# Patient Record
Sex: FEMALE | Race: WHITE | NOT HISPANIC OR LATINO | Employment: FULL TIME | URBAN - METROPOLITAN AREA
[De-identification: names, ages, dates, MRNs, and addresses within clinical notes are randomized per-mention and may not be internally consistent; named-entity substitution may affect disease eponyms.]

---

## 2020-01-29 ENCOUNTER — CLINICAL SUPPORT (OUTPATIENT)
Dept: AUDIOLOGY | Facility: CLINIC | Age: 40
End: 2020-01-29
Payer: COMMERCIAL

## 2020-01-29 ENCOUNTER — OFFICE VISIT (OUTPATIENT)
Dept: OTOLARYNGOLOGY | Facility: CLINIC | Age: 40
End: 2020-01-29
Payer: COMMERCIAL

## 2020-01-29 VITALS
DIASTOLIC BLOOD PRESSURE: 83 MMHG | TEMPERATURE: 98 F | HEART RATE: 79 BPM | WEIGHT: 259.5 LBS | SYSTOLIC BLOOD PRESSURE: 134 MMHG

## 2020-01-29 DIAGNOSIS — H81.11 BPPV (BENIGN PAROXYSMAL POSITIONAL VERTIGO), RIGHT: ICD-10-CM

## 2020-01-29 DIAGNOSIS — J01.00 ACUTE NON-RECURRENT MAXILLARY SINUSITIS: ICD-10-CM

## 2020-01-29 DIAGNOSIS — H72.92 TYMPANIC MEMBRANE PERFORATION, LEFT: Primary | ICD-10-CM

## 2020-01-29 DIAGNOSIS — H81.11 BPPV (BENIGN PAROXYSMAL POSITIONAL VERTIGO), RIGHT: Primary | ICD-10-CM

## 2020-01-29 PROCEDURE — 99999 PR PBB SHADOW E&M-NEW PATIENT-LVL III: ICD-10-PCS | Mod: PBBFAC,,, | Performed by: PHYSICIAN ASSISTANT

## 2020-01-29 PROCEDURE — 99203 OFFICE O/P NEW LOW 30 MIN: CPT | Mod: 25,S$GLB,, | Performed by: PHYSICIAN ASSISTANT

## 2020-01-29 PROCEDURE — 99203 PR OFFICE/OUTPT VISIT, NEW, LEVL III, 30-44 MIN: ICD-10-PCS | Mod: 25,S$GLB,, | Performed by: PHYSICIAN ASSISTANT

## 2020-01-29 PROCEDURE — 99999 PR PBB SHADOW E&M-NEW PATIENT-LVL III: CPT | Mod: PBBFAC,,, | Performed by: PHYSICIAN ASSISTANT

## 2020-01-29 PROCEDURE — 95992 CANALITH REPOSITIONING PROC: CPT | Mod: S$GLB,,, | Performed by: PHYSICIAN ASSISTANT

## 2020-01-29 PROCEDURE — 92567 PR TYMPA2METRY: ICD-10-PCS | Mod: S$GLB,,, | Performed by: AUDIOLOGIST-HEARING AID FITTER

## 2020-01-29 PROCEDURE — 95992 PR CANALITH REPOSITIONING PROCEDURE, PER DAY: ICD-10-PCS | Mod: S$GLB,,, | Performed by: PHYSICIAN ASSISTANT

## 2020-01-29 PROCEDURE — 92567 TYMPANOMETRY: CPT | Mod: S$GLB,,, | Performed by: AUDIOLOGIST-HEARING AID FITTER

## 2020-01-29 RX ORDER — UREA 40 %
CREAM (GRAM) TOPICAL
COMMUNITY
Start: 2019-12-05 | End: 2022-08-17 | Stop reason: SDUPTHER

## 2020-01-29 RX ORDER — FLUTICASONE PROPIONATE 50 MCG
SPRAY, SUSPENSION (ML) NASAL
COMMUNITY
Start: 2020-01-22

## 2020-01-29 RX ORDER — LAMOTRIGINE 200 MG/1
TABLET ORAL
COMMUNITY
Start: 2020-01-04

## 2020-01-29 RX ORDER — CLINDAMYCIN AND BENZOYL PEROXIDE 10; 50 MG/G; MG/G
GEL TOPICAL
COMMUNITY
Start: 2019-12-05 | End: 2022-08-17 | Stop reason: SDUPTHER

## 2020-01-29 RX ORDER — OMEPRAZOLE 20 MG/1
CAPSULE, DELAYED RELEASE ORAL
COMMUNITY
Start: 2020-01-04

## 2020-01-29 RX ORDER — GABAPENTIN 600 MG/1
TABLET ORAL
COMMUNITY
Start: 2020-01-04

## 2020-01-29 RX ORDER — SERTRALINE HYDROCHLORIDE 100 MG/1
TABLET, FILM COATED ORAL
COMMUNITY
Start: 2020-01-04

## 2020-01-29 RX ORDER — TIZANIDINE 4 MG/1
TABLET ORAL
COMMUNITY
Start: 2020-01-04

## 2020-01-29 RX ORDER — METHYLPREDNISOLONE 4 MG/1
TABLET ORAL
Qty: 1 PACKAGE | Refills: 0 | Status: ON HOLD | OUTPATIENT
Start: 2020-01-29 | End: 2021-03-10 | Stop reason: HOSPADM

## 2020-01-29 RX ORDER — ESZOPICLONE 2 MG/1
TABLET, FILM COATED ORAL
COMMUNITY
Start: 2020-01-22

## 2020-01-29 RX ORDER — TRIAMCINOLONE ACETONIDE 1 MG/G
CREAM TOPICAL
COMMUNITY
Start: 2019-12-05

## 2020-01-29 RX ORDER — AMOXICILLIN AND CLAVULANATE POTASSIUM 875; 125 MG/1; MG/1
TABLET, FILM COATED ORAL
Status: ON HOLD | COMMUNITY
Start: 2020-01-24 | End: 2021-03-10 | Stop reason: HOSPADM

## 2020-01-29 NOTE — LETTER
January 29, 2020      [facility]  [id number] [place] [address]  [facility] [place] [address]  Phone: [phone]  Fax: [phone]       Patient: Selena Spring   YOB: 1980  Date of Visit: 01/29/2020    To Whom It May Concern:    Isi Spring  was at [facility] on 01/29/2020. She may return to work/school on 2/6/2020 with no restrictions. If you have any questions or concerns, or if I can be of further assistance, please do not hesitate to contact me.    Sincerely,        DESIREE Tran

## 2020-01-29 NOTE — PROGRESS NOTES
Subjective:       Patient ID: Selena Spring is a 39 y.o. female.    Chief Complaint: Ear Fullness (right steroid inj with pcp and given amoxil); Otalgia (right ); and Dizziness    Ms. Spring is a very pleasant 38 yo female here to see me today with complaints of right ear pain/fullness and dizziness. Right sided HA and facial pressure overlying sinus. She had a decadron shot and antibiotics ( which she is still taking) last week. She has been having dizziness when she moves her head quickly and arising from the bed.     Review of Systems   Constitutional: Negative for chills, fatigue, fever and unexpected weight change.   HENT: Positive for congestion, sinus pressure and sinus pain. Negative for dental problem, ear discharge, ear pain, facial swelling, hearing loss, nosebleeds, postnasal drip, rhinorrhea, sneezing, sore throat, tinnitus, trouble swallowing and voice change.    Eyes: Negative for redness, itching and visual disturbance.   Respiratory: Negative for cough, choking, shortness of breath and wheezing.    Cardiovascular: Negative for chest pain and palpitations.   Gastrointestinal: Negative for abdominal pain.        No reflux.   Musculoskeletal: Negative for gait problem.   Skin: Negative for rash.   Neurological: Positive for dizziness and headaches. Negative for light-headedness.       Objective:      Physical Exam   Constitutional: She is oriented to person, place, and time. She appears well-developed and well-nourished. No distress.   HENT:   Head: Normocephalic and atraumatic.   Right Ear: External ear and ear canal normal. Tympanic membrane is scarred.   Left Ear: External ear and ear canal normal. Tympanic membrane is perforated (large).   Nose: Mucosal edema present. No rhinorrhea, nasal deformity or septal deviation. No epistaxis. Right sinus exhibits maxillary sinus tenderness. Right sinus exhibits no frontal sinus tenderness. Left sinus exhibits no maxillary sinus tenderness and no frontal  sinus tenderness.   Mouth/Throat: Uvula is midline, oropharynx is clear and moist and mucous membranes are normal. Mucous membranes are not pale and not dry. No dental caries. No oropharyngeal exudate or posterior oropharyngeal erythema.   DEMARCUS BARRAZA PIKE (+) RIGHT   Eyes: Pupils are equal, round, and reactive to light. Conjunctivae, EOM and lids are normal. Right eye exhibits no chemosis. Left eye exhibits no chemosis. Right conjunctiva is not injected. Left conjunctiva is not injected. No scleral icterus. Right eye exhibits normal extraocular motion and no nystagmus. Left eye exhibits normal extraocular motion and no nystagmus.   Neck: Trachea normal and phonation normal. No tracheal tenderness present. No tracheal deviation present. No thyroid mass and no thyromegaly present.   Cardiovascular: Intact distal pulses.   Pulmonary/Chest: Effort normal. No stridor. No respiratory distress.   Abdominal: She exhibits no distension.   Lymphadenopathy:        Head (right side): No submental, no submandibular, no preauricular, no posterior auricular and no occipital adenopathy present.        Head (left side): No submental, no submandibular, no preauricular, no posterior auricular and no occipital adenopathy present.     She has no cervical adenopathy.   Neurological: She is alert and oriented to person, place, and time. No cranial nerve deficit.   Skin: Skin is warm and dry. No rash noted. No erythema.   Psychiatric: She has a normal mood and affect. Her behavior is normal.       Assessment:       1. BPPV (benign paroxysmal positional vertigo), right    2. Acute non-recurrent maxillary sinusitis        Plan:         Selena Spring was seen 01/29/2020 for Epley maneuver for BPPV in the right ear.      A 5-position Epley maneuver was performed for the right.  Patient tolerated the maneuver well and was asymptomatic upon discharge.  No nystagmus seen on post-procedure examination.  Post-Epley home instructions were reviewed  and given to the patient.  Understanding was voiced.    I also started her on medrol Dosepak for sinus infection relief. She is already taking augmentin.

## 2020-01-29 NOTE — LETTER
January 29, 2020      [facility]  [id number] [place] [address]  [facility] [place] [address]  Phone: [phone]  Fax: [phone]       Patient: Selena Spring   YOB: 1980  Date of Visit: 01/29/2020    To Whom It May Concern:    Isi Spring  was at [facility] on 01/29/2020. She may return to work/school on 2/3/2020 with no restrictions. If you have any questions or concerns, or if I can be of further assistance, please do not hesitate to contact me.    Sincerely,    LILLY Tran MA

## 2020-01-29 NOTE — PROGRESS NOTES
"Tympanograms were completed today 01/29/20 per Sammi Correa PA-C.  Right ear: Type "A"  1.4 ml @ 15 daPa.  ECV: .8 ml  Left ear: CNS, consistent with TM perforation    Right BPPV: assisted ENT with right Epley maneuver.   "

## 2020-02-05 ENCOUNTER — CLINICAL SUPPORT (OUTPATIENT)
Dept: AUDIOLOGY | Facility: CLINIC | Age: 40
End: 2020-02-05
Payer: COMMERCIAL

## 2020-02-05 DIAGNOSIS — H81.8X9 OTHER DISORDERS OF VESTIBULAR FUNCTION, UNSPECIFIED EAR: Primary | ICD-10-CM

## 2020-02-05 DIAGNOSIS — H90.12 CONDUCTIVE HEARING LOSS OF LEFT EAR WITH UNRESTRICTED HEARING OF RIGHT EAR: Primary | ICD-10-CM

## 2020-02-05 PROCEDURE — 92557 PR COMPREHENSIVE HEARING TEST: ICD-10-PCS | Mod: S$GLB,,, | Performed by: AUDIOLOGIST

## 2020-02-05 PROCEDURE — 92542 POSITIONAL NYSTAGMUS TEST: CPT | Mod: S$GLB,,, | Performed by: AUDIOLOGIST-HEARING AID FITTER

## 2020-02-05 PROCEDURE — 92567 PR TYMPA2METRY: ICD-10-PCS | Mod: S$GLB,,, | Performed by: AUDIOLOGIST

## 2020-02-05 PROCEDURE — 92557 COMPREHENSIVE HEARING TEST: CPT | Mod: S$GLB,,, | Performed by: AUDIOLOGIST

## 2020-02-05 PROCEDURE — 92542 PR POSITIONAL NYSTAGMUS TEST: ICD-10-PCS | Mod: S$GLB,,, | Performed by: AUDIOLOGIST-HEARING AID FITTER

## 2020-02-05 PROCEDURE — 92567 TYMPANOMETRY: CPT | Mod: S$GLB,,, | Performed by: AUDIOLOGIST

## 2020-02-05 NOTE — PROGRESS NOTES
Selena Spring was seen 02/05/2020 for an audiological evaluation.  Patient complains of hearing loss in the left ear.  She was treated prior to audiogram by Fritz Greer for BPPV in the right ear.  She will return on Tuesday, 2/11/2020 for VNG only.  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  She reports family history of hearing loss (both grandfathers.)    Results reveal a borderline normal hearing with a slight low frequency air/bone gap 250-8000 Hz for the right ear, and  Moderate rising to mild  conductive hearing loss 250-8000 Hz for the left ear.   Speech Reception Thresholds were  10 dBHL for the right ear and 30 dBHL for the left ear.   Word recognition scores were excellent for the right ear and good for the left ear.   Tympanograms were unable to obtain for the right ear and unable to obtain for the left ear.    Patient was counseled on the above findings.    REC:  VNG scheduled for 2/11/2020

## 2020-02-05 NOTE — PROGRESS NOTES
Referring provider: Sammi Correa PA-C    Selena Spring was seen 02/05/2020 for a vestibular screen for re-check BPPV.  Patient had right Epley maneuver last week for right BPPV.  She reports dizziness improved for several days but returned two days ago.  She reports sensation that the world is moving after she stops moving.  She now gets dizzy when lying on her left side.      VNG Screen:  Spontaneous test: Absent for nystagmus  Static Positional test: Absent for nystagmus  Dallas-Hallpike Right: Delayed onset 16 d/s down-beating nystagmus with slight 4 d/s RB nystagmus (non-torsional).  The nystagmus did not fatigue, but did suppress with fixation.  Dizziness was reported.   Yosi-Hallpike Left: 5 d/s down-beating + slight 4 d/s LB nystagmus (non-torsional).  The nystagmus did not fatigue, but did suppress with fixation.  Dizziness was less intense to HHL.   Yosi-Hallpike was re-tested without the video goggles, following a rest period: No nystagmus or dizziness was elicited.   Note: Patient has taken medications that may suppress a vestibular response.     Summary: Non-localizing positional nystagmus.  Diagnostic VNG with audiogram.     Recommendations:  1. Audiogram to be completed today.  2. VNG is scheduled for next week.  Pre-test instructions were provided and reviewed.

## 2020-02-11 ENCOUNTER — CLINICAL SUPPORT (OUTPATIENT)
Dept: AUDIOLOGY | Facility: CLINIC | Age: 40
End: 2020-02-11
Payer: COMMERCIAL

## 2020-02-11 DIAGNOSIS — H81.11 BPPV (BENIGN PAROXYSMAL POSITIONAL VERTIGO), RIGHT: Primary | ICD-10-CM

## 2020-02-11 PROCEDURE — 92537 PR CALORIC VSTBLR TEST W/REC BITHERMAL: ICD-10-PCS | Mod: S$GLB,,, | Performed by: AUDIOLOGIST-HEARING AID FITTER

## 2020-02-11 PROCEDURE — 92540 BASIC VESTIBULAR EVALUATION: CPT | Mod: S$GLB,,, | Performed by: AUDIOLOGIST-HEARING AID FITTER

## 2020-02-11 PROCEDURE — 92540 PR VESTIBULAR EVAL NYSTAG FOVL&PERPH STIM OSCIL TRACKING: ICD-10-PCS | Mod: S$GLB,,, | Performed by: AUDIOLOGIST-HEARING AID FITTER

## 2020-02-11 PROCEDURE — 92537 CALORIC VSTBLR TEST W/REC: CPT | Mod: S$GLB,,, | Performed by: AUDIOLOGIST-HEARING AID FITTER

## 2020-02-11 NOTE — PROGRESS NOTES
Referring provider: Sammi Correa PA-C    Selena Spring was seen 2020 for Videonystagmography (VNG) testing.    Patient was first seen 20 and had in-office Epley for right BPPV.  Following she reports vertigo improved but she continues to have some dizziness.  Dizzy symptoms are now short burst of world spinning and sensation of unstable when walking.  The dizziness is provoked only when turning her head to the right, primarily when lying, upon standing, upon sitting or when squatting.  Patient has left tympanic membrane perforation.  Had audiogram 20: left conductive hearing loss; right borderline normal hearing with mild air-bone-gap.     Oculomotor function tests (sinusoidal tracking, saccade and OPKs) were normal and symmetric.  Gaze test was absent for nystagmus.  Spontaneous nystagmus was absent.  Head-shake test was not tested due to neck problems.   McDade-Hallpike Left was absent for nystagmus or dizziness.  Yosi-Hallpike Right revealed 4 d/s LB with dizziness; (+) fixation suppression.  Static Positional test revealed nystagmus that suppressed with fixation:   Head Center: 4 d/s UB   Head Right: 4 d/s LB   Head Left: Absent  Bi-thermal caloric irrigations revealed a 11% caloric weakness in the right ear, which is within normal limits, and 11% directional preponderance to the right, which is within normal limits.  Fixation suppression following caloric irrigations were normal.  RC: 44 d/s   RW: 33 d/s   d/s   LW: CNT - patient unable to tolerate due to TM perforation    Impressions: Normal VNG.  Positional nystagmus to head right which may suggest incomplete central compensation.  A RIGHT Epley maneuver was completed today due to the presence of nystagmus.  Patient tolerated well and was asymptomatic upon discharge.      Rec:  1. Post-Epley head restrictions for five days.  2. Patient was provided Bacon-Daroff exercises to start in five days as needed, perform daily X 2-3  weeks.  3. Patient was provided with home right Epley maneuver exercise handout, to perform as needed.  4. If dizziness persists after one month of home exercises, patient is advised to contact ENT to request formal VRT referral.     Tracings are scanned into computer.

## 2021-02-12 ENCOUNTER — TELEPHONE (OUTPATIENT)
Dept: ORTHOPEDICS | Facility: CLINIC | Age: 41
End: 2021-02-12

## 2021-02-12 DIAGNOSIS — M25.551 RIGHT HIP PAIN: Primary | ICD-10-CM

## 2021-02-17 ENCOUNTER — TELEPHONE (OUTPATIENT)
Dept: ORTHOPEDICS | Facility: CLINIC | Age: 41
End: 2021-02-17

## 2021-02-17 ENCOUNTER — HOSPITAL ENCOUNTER (OUTPATIENT)
Dept: RADIOLOGY | Facility: HOSPITAL | Age: 41
Discharge: HOME | End: 2021-02-17
Attending: ORTHOPAEDIC SURGERY
Payer: COMMERCIAL

## 2021-02-17 ENCOUNTER — OFFICE VISIT (OUTPATIENT)
Dept: ORTHOPEDICS | Facility: CLINIC | Age: 41
End: 2021-02-17
Payer: COMMERCIAL

## 2021-02-17 VITALS — HEIGHT: 66 IN | WEIGHT: 220 LBS | BODY MASS INDEX: 35.36 KG/M2

## 2021-02-17 DIAGNOSIS — M25.551 RIGHT HIP PAIN: ICD-10-CM

## 2021-02-17 DIAGNOSIS — M25.851 RIGHT HIP IMPINGEMENT SYNDROME: Primary | ICD-10-CM

## 2021-02-17 PROCEDURE — 73502 X-RAY EXAM HIP UNI 2-3 VIEWS: CPT | Mod: 26,RT,, | Performed by: RADIOLOGY

## 2021-02-17 PROCEDURE — 73502 X-RAY EXAM HIP UNI 2-3 VIEWS: CPT | Mod: TC,RT

## 2021-02-17 PROCEDURE — 99204 OFFICE O/P NEW MOD 45 MIN: CPT | Mod: S$GLB,,, | Performed by: ORTHOPAEDIC SURGERY

## 2021-02-17 PROCEDURE — 3008F BODY MASS INDEX DOCD: CPT | Mod: CPTII,S$GLB,, | Performed by: ORTHOPAEDIC SURGERY

## 2021-02-17 PROCEDURE — 1125F AMNT PAIN NOTED PAIN PRSNT: CPT | Mod: S$GLB,,, | Performed by: ORTHOPAEDIC SURGERY

## 2021-02-17 PROCEDURE — 1125F PR PAIN SEVERITY QUANTIFIED, PAIN PRESENT: ICD-10-PCS | Mod: S$GLB,,, | Performed by: ORTHOPAEDIC SURGERY

## 2021-02-17 PROCEDURE — 73502 XR HIP 2 VIEW RIGHT: ICD-10-PCS | Mod: 26,RT,, | Performed by: RADIOLOGY

## 2021-02-17 PROCEDURE — 3008F PR BODY MASS INDEX (BMI) DOCUMENTED: ICD-10-PCS | Mod: CPTII,S$GLB,, | Performed by: ORTHOPAEDIC SURGERY

## 2021-02-17 PROCEDURE — 99999 PR PBB SHADOW E&M-EST. PATIENT-LVL III: ICD-10-PCS | Mod: PBBFAC,,, | Performed by: ORTHOPAEDIC SURGERY

## 2021-02-17 PROCEDURE — 99204 PR OFFICE/OUTPT VISIT, NEW, LEVL IV, 45-59 MIN: ICD-10-PCS | Mod: S$GLB,,, | Performed by: ORTHOPAEDIC SURGERY

## 2021-02-17 PROCEDURE — 99999 PR PBB SHADOW E&M-EST. PATIENT-LVL III: CPT | Mod: PBBFAC,,, | Performed by: ORTHOPAEDIC SURGERY

## 2021-02-23 ENCOUNTER — TELEPHONE (OUTPATIENT)
Dept: ORTHOPEDICS | Facility: CLINIC | Age: 41
End: 2021-02-23

## 2021-02-23 DIAGNOSIS — M25.559 HIP PAIN: Primary | ICD-10-CM

## 2021-02-25 ENCOUNTER — TELEPHONE (OUTPATIENT)
Dept: PAIN MEDICINE | Facility: CLINIC | Age: 41
End: 2021-02-25

## 2021-02-25 ENCOUNTER — PATIENT MESSAGE (OUTPATIENT)
Dept: PAIN MEDICINE | Facility: CLINIC | Age: 41
End: 2021-02-25

## 2021-02-25 DIAGNOSIS — M25.551 RIGHT HIP PAIN: Primary | ICD-10-CM

## 2021-03-02 ENCOUNTER — TELEPHONE (OUTPATIENT)
Dept: ORTHOPEDICS | Facility: CLINIC | Age: 41
End: 2021-03-02

## 2021-03-08 ENCOUNTER — PATIENT MESSAGE (OUTPATIENT)
Dept: PAIN MEDICINE | Facility: HOSPITAL | Age: 41
End: 2021-03-08

## 2021-03-10 ENCOUNTER — HOSPITAL ENCOUNTER (OUTPATIENT)
Facility: HOSPITAL | Age: 41
Discharge: HOME | End: 2021-03-10
Attending: ANESTHESIOLOGY | Admitting: ANESTHESIOLOGY
Payer: COMMERCIAL

## 2021-03-10 VITALS
OXYGEN SATURATION: 96 % | HEIGHT: 66 IN | WEIGHT: 222 LBS | TEMPERATURE: 98 F | SYSTOLIC BLOOD PRESSURE: 130 MMHG | HEART RATE: 70 BPM | BODY MASS INDEX: 35.68 KG/M2 | DIASTOLIC BLOOD PRESSURE: 75 MMHG | RESPIRATION RATE: 16 BRPM

## 2021-03-10 DIAGNOSIS — M25.559 HIP PAIN: Primary | ICD-10-CM

## 2021-03-10 LAB
B-HCG UR QL: NEGATIVE
CTP QC/QA: YES

## 2021-03-10 PROCEDURE — 77002 NEEDLE LOCALIZATION BY XRAY: CPT | Mod: 26,,, | Performed by: ANESTHESIOLOGY

## 2021-03-10 PROCEDURE — 81025 URINE PREGNANCY TEST: CPT | Performed by: ANESTHESIOLOGY

## 2021-03-10 PROCEDURE — 63600175 PHARM REV CODE 636 W HCPCS: Performed by: ANESTHESIOLOGY

## 2021-03-10 PROCEDURE — 20610 DRAIN/INJ JOINT/BURSA W/O US: CPT | Performed by: ANESTHESIOLOGY

## 2021-03-10 PROCEDURE — 20610 PR DRAIN/INJECT LARGE JOINT/BURSA: ICD-10-PCS | Mod: RT,,, | Performed by: ANESTHESIOLOGY

## 2021-03-10 PROCEDURE — 77002 PR FLUOROSCOPIC GUIDANCE NEEDLE PLACEMENT: ICD-10-PCS | Mod: 26,,, | Performed by: ANESTHESIOLOGY

## 2021-03-10 PROCEDURE — 25500020 PHARM REV CODE 255: Performed by: ANESTHESIOLOGY

## 2021-03-10 PROCEDURE — 25000003 PHARM REV CODE 250: Performed by: ANESTHESIOLOGY

## 2021-03-10 PROCEDURE — 20610 DRAIN/INJ JOINT/BURSA W/O US: CPT | Mod: RT,,, | Performed by: ANESTHESIOLOGY

## 2021-03-10 RX ORDER — METHYLPREDNISOLONE ACETATE 40 MG/ML
INJECTION, SUSPENSION INTRA-ARTICULAR; INTRALESIONAL; INTRAMUSCULAR; SOFT TISSUE
Status: DISCONTINUED | OUTPATIENT
Start: 2021-03-10 | End: 2021-03-10 | Stop reason: HOSPADM

## 2021-03-10 RX ORDER — FENTANYL CITRATE 50 UG/ML
INJECTION, SOLUTION INTRAMUSCULAR; INTRAVENOUS
Status: DISCONTINUED | OUTPATIENT
Start: 2021-03-10 | End: 2021-03-10 | Stop reason: HOSPADM

## 2021-03-10 RX ORDER — LIDOCAINE HYDROCHLORIDE 20 MG/ML
INJECTION, SOLUTION EPIDURAL; INFILTRATION; INTRACAUDAL; PERINEURAL
Status: DISCONTINUED | OUTPATIENT
Start: 2021-03-10 | End: 2021-03-10 | Stop reason: HOSPADM

## 2021-03-10 RX ORDER — MIDAZOLAM HYDROCHLORIDE 1 MG/ML
INJECTION, SOLUTION INTRAMUSCULAR; INTRAVENOUS
Status: DISCONTINUED | OUTPATIENT
Start: 2021-03-10 | End: 2021-03-10 | Stop reason: HOSPADM

## 2021-03-18 ENCOUNTER — PATIENT MESSAGE (OUTPATIENT)
Dept: ORTHOPEDICS | Facility: CLINIC | Age: 41
End: 2021-03-18

## 2021-03-18 DIAGNOSIS — M25.559 HIP PAIN: Primary | ICD-10-CM

## 2021-03-19 ENCOUNTER — HOSPITAL ENCOUNTER (OUTPATIENT)
Dept: RADIOLOGY | Facility: HOSPITAL | Age: 41
Discharge: HOME | End: 2021-03-19
Attending: ORTHOPAEDIC SURGERY
Payer: COMMERCIAL

## 2021-03-19 ENCOUNTER — OFFICE VISIT (OUTPATIENT)
Dept: ORTHOPEDICS | Facility: CLINIC | Age: 41
End: 2021-03-19
Payer: COMMERCIAL

## 2021-03-19 VITALS — DIASTOLIC BLOOD PRESSURE: 83 MMHG | SYSTOLIC BLOOD PRESSURE: 125 MMHG | HEART RATE: 73 BPM

## 2021-03-19 DIAGNOSIS — M25.559 HIP PAIN: ICD-10-CM

## 2021-03-19 DIAGNOSIS — M70.62 GREATER TROCHANTERIC BURSITIS OF LEFT HIP: Primary | ICD-10-CM

## 2021-03-19 DIAGNOSIS — M25.851 HIP IMPINGEMENT SYNDROME, RIGHT: ICD-10-CM

## 2021-03-19 PROCEDURE — 20610 LARGE JOINT ASPIRATION/INJECTION: L GREATER TROCHANTERIC BURSA: ICD-10-PCS | Mod: LT,S$GLB,, | Performed by: PHYSICIAN ASSISTANT

## 2021-03-19 PROCEDURE — 73503 X-RAY EXAM HIP UNI 4/> VIEWS: CPT | Mod: TC,LT

## 2021-03-19 PROCEDURE — 99999 PR PBB SHADOW E&M-EST. PATIENT-LVL III: ICD-10-PCS | Mod: PBBFAC,,, | Performed by: ORTHOPAEDIC SURGERY

## 2021-03-19 PROCEDURE — 20610 DRAIN/INJ JOINT/BURSA W/O US: CPT | Mod: LT,S$GLB,, | Performed by: PHYSICIAN ASSISTANT

## 2021-03-19 PROCEDURE — 99999 PR PBB SHADOW E&M-EST. PATIENT-LVL III: CPT | Mod: PBBFAC,,, | Performed by: ORTHOPAEDIC SURGERY

## 2021-03-19 PROCEDURE — 73503 XR HIP 4 OR MORE VIEWS LEFT: ICD-10-PCS | Mod: 26,LT,, | Performed by: RADIOLOGY

## 2021-03-19 PROCEDURE — 99214 PR OFFICE/OUTPT VISIT, EST, LEVL IV, 30-39 MIN: ICD-10-PCS | Mod: 25,S$GLB,, | Performed by: ORTHOPAEDIC SURGERY

## 2021-03-19 PROCEDURE — 73503 X-RAY EXAM HIP UNI 4/> VIEWS: CPT | Mod: 26,LT,, | Performed by: RADIOLOGY

## 2021-03-19 PROCEDURE — 99214 OFFICE O/P EST MOD 30 MIN: CPT | Mod: 25,S$GLB,, | Performed by: ORTHOPAEDIC SURGERY

## 2021-03-19 RX ORDER — METHYLPREDNISOLONE ACETATE 80 MG/ML
80 INJECTION, SUSPENSION INTRA-ARTICULAR; INTRALESIONAL; INTRAMUSCULAR; SOFT TISSUE
Status: DISCONTINUED | OUTPATIENT
Start: 2021-03-19 | End: 2021-03-19 | Stop reason: HOSPADM

## 2021-03-19 RX ADMIN — METHYLPREDNISOLONE ACETATE 80 MG: 80 INJECTION, SUSPENSION INTRA-ARTICULAR; INTRALESIONAL; INTRAMUSCULAR; SOFT TISSUE at 10:03

## 2021-04-19 ENCOUNTER — PATIENT MESSAGE (OUTPATIENT)
Dept: ORTHOPEDICS | Facility: CLINIC | Age: 41
End: 2021-04-19

## 2021-04-29 ENCOUNTER — OFFICE VISIT (OUTPATIENT)
Dept: ORTHOPEDICS | Facility: CLINIC | Age: 41
End: 2021-04-29
Payer: COMMERCIAL

## 2021-04-29 DIAGNOSIS — M25.559 PAIN IN UNSPECIFIED HIP: ICD-10-CM

## 2021-04-29 DIAGNOSIS — M70.61 GREATER TROCHANTERIC BURSITIS OF RIGHT HIP: ICD-10-CM

## 2021-04-29 DIAGNOSIS — M25.551 RIGHT HIP PAIN: ICD-10-CM

## 2021-04-29 DIAGNOSIS — M76.31 IT BAND SYNDROME, RIGHT: ICD-10-CM

## 2021-04-29 DIAGNOSIS — M25.851 RIGHT HIP IMPINGEMENT SYNDROME: Primary | ICD-10-CM

## 2021-04-29 PROCEDURE — 1125F PR PAIN SEVERITY QUANTIFIED, PAIN PRESENT: ICD-10-PCS | Mod: S$GLB,,, | Performed by: ORTHOPAEDIC SURGERY

## 2021-04-29 PROCEDURE — 1125F AMNT PAIN NOTED PAIN PRSNT: CPT | Mod: S$GLB,,, | Performed by: ORTHOPAEDIC SURGERY

## 2021-04-29 PROCEDURE — 99999 PR PBB SHADOW E&M-EST. PATIENT-LVL III: CPT | Mod: PBBFAC,,, | Performed by: ORTHOPAEDIC SURGERY

## 2021-04-29 PROCEDURE — 99999 PR PBB SHADOW E&M-EST. PATIENT-LVL III: ICD-10-PCS | Mod: PBBFAC,,, | Performed by: ORTHOPAEDIC SURGERY

## 2021-04-29 PROCEDURE — 99214 OFFICE O/P EST MOD 30 MIN: CPT | Mod: S$GLB,,, | Performed by: ORTHOPAEDIC SURGERY

## 2021-04-29 PROCEDURE — 99214 PR OFFICE/OUTPT VISIT, EST, LEVL IV, 30-39 MIN: ICD-10-PCS | Mod: S$GLB,,, | Performed by: ORTHOPAEDIC SURGERY

## 2021-05-02 ENCOUNTER — PATIENT MESSAGE (OUTPATIENT)
Dept: ORTHOPEDICS | Facility: CLINIC | Age: 41
End: 2021-05-02

## 2021-05-04 ENCOUNTER — HOSPITAL ENCOUNTER (OUTPATIENT)
Dept: RADIOLOGY | Facility: HOSPITAL | Age: 41
Discharge: HOME | End: 2021-05-04
Attending: PHYSICIAN ASSISTANT
Payer: COMMERCIAL

## 2021-05-04 DIAGNOSIS — M25.851 RIGHT HIP IMPINGEMENT SYNDROME: ICD-10-CM

## 2021-05-04 DIAGNOSIS — M25.559 PAIN IN UNSPECIFIED HIP: ICD-10-CM

## 2021-05-04 PROCEDURE — 73722 MRI JOINT OF LWR EXTR W/DYE: CPT | Mod: 26,RT,, | Performed by: RADIOLOGY

## 2021-05-04 PROCEDURE — 73722 MRI ARTHROGRAM HIP RIGHT: ICD-10-PCS | Mod: 26,RT,, | Performed by: RADIOLOGY

## 2021-05-04 PROCEDURE — 73525 CONTRAST X-RAY OF HIP: CPT | Mod: TC,RT

## 2021-05-04 PROCEDURE — 73525 XR ARTHROGRAM HIP RIGHT: ICD-10-PCS | Mod: 26,RT,, | Performed by: RADIOLOGY

## 2021-05-04 PROCEDURE — 25000003 PHARM REV CODE 250: Performed by: PHYSICIAN ASSISTANT

## 2021-05-04 PROCEDURE — A9585 GADOBUTROL INJECTION: HCPCS | Performed by: PHYSICIAN ASSISTANT

## 2021-05-04 PROCEDURE — 27093 INJECTION FOR HIP X-RAY: CPT

## 2021-05-04 PROCEDURE — 73722 MRI JOINT OF LWR EXTR W/DYE: CPT | Mod: TC,RT

## 2021-05-04 PROCEDURE — 27093 INJECTION FOR HIP X-RAY: CPT | Mod: ,,, | Performed by: RADIOLOGY

## 2021-05-04 PROCEDURE — 27093 XR ARTHROGRAM HIP RIGHT: ICD-10-PCS | Mod: ,,, | Performed by: RADIOLOGY

## 2021-05-04 PROCEDURE — 25500020 PHARM REV CODE 255: Performed by: PHYSICIAN ASSISTANT

## 2021-05-04 PROCEDURE — 73525 CONTRAST X-RAY OF HIP: CPT | Mod: 26,RT,, | Performed by: RADIOLOGY

## 2021-05-04 RX ORDER — GADOBUTROL 604.72 MG/ML
0.1 INJECTION INTRAVENOUS
Status: COMPLETED | OUTPATIENT
Start: 2021-05-04 | End: 2021-05-04

## 2021-05-04 RX ORDER — LIDOCAINE HYDROCHLORIDE 10 MG/ML
5 INJECTION INFILTRATION; PERINEURAL ONCE
Status: COMPLETED | OUTPATIENT
Start: 2021-05-04 | End: 2021-05-04

## 2021-05-04 RX ADMIN — LIDOCAINE HYDROCHLORIDE 5 ML: 10 INJECTION, SOLUTION INFILTRATION; PERINEURAL at 11:05

## 2021-05-04 RX ADMIN — GADOBUTROL 0.1 ML: 604.72 INJECTION INTRAVENOUS at 11:05

## 2021-05-04 RX ADMIN — IOHEXOL 10 ML: 350 INJECTION, SOLUTION INTRAVENOUS at 11:05

## 2021-05-06 ENCOUNTER — OFFICE VISIT (OUTPATIENT)
Dept: ORTHOPEDICS | Facility: CLINIC | Age: 41
End: 2021-05-06
Payer: COMMERCIAL

## 2021-05-06 VITALS — HEIGHT: 66 IN | BODY MASS INDEX: 35.68 KG/M2 | WEIGHT: 222 LBS

## 2021-05-06 DIAGNOSIS — M25.851 HIP IMPINGEMENT SYNDROME, RIGHT: ICD-10-CM

## 2021-05-06 DIAGNOSIS — S73.192A TEAR OF LEFT ACETABULAR LABRUM, INITIAL ENCOUNTER: ICD-10-CM

## 2021-05-06 DIAGNOSIS — M70.61 GREATER TROCHANTERIC BURSITIS OF RIGHT HIP: ICD-10-CM

## 2021-05-06 DIAGNOSIS — M76.31 IT BAND SYNDROME, RIGHT: ICD-10-CM

## 2021-05-06 DIAGNOSIS — M25.551 RIGHT HIP PAIN: ICD-10-CM

## 2021-05-06 DIAGNOSIS — M25.559 HIP PAIN: ICD-10-CM

## 2021-05-06 DIAGNOSIS — M25.851 RIGHT HIP IMPINGEMENT SYNDROME: Primary | ICD-10-CM

## 2021-05-06 DIAGNOSIS — M70.62 GREATER TROCHANTERIC BURSITIS OF LEFT HIP: ICD-10-CM

## 2021-05-06 PROCEDURE — 99999 PR PBB SHADOW E&M-EST. PATIENT-LVL III: CPT | Mod: PBBFAC,,, | Performed by: ORTHOPAEDIC SURGERY

## 2021-05-06 PROCEDURE — 3008F PR BODY MASS INDEX (BMI) DOCUMENTED: ICD-10-PCS | Mod: CPTII,S$GLB,, | Performed by: ORTHOPAEDIC SURGERY

## 2021-05-06 PROCEDURE — 99214 OFFICE O/P EST MOD 30 MIN: CPT | Mod: S$GLB,,, | Performed by: ORTHOPAEDIC SURGERY

## 2021-05-06 PROCEDURE — 1125F PR PAIN SEVERITY QUANTIFIED, PAIN PRESENT: ICD-10-PCS | Mod: S$GLB,,, | Performed by: ORTHOPAEDIC SURGERY

## 2021-05-06 PROCEDURE — 99214 PR OFFICE/OUTPT VISIT, EST, LEVL IV, 30-39 MIN: ICD-10-PCS | Mod: S$GLB,,, | Performed by: ORTHOPAEDIC SURGERY

## 2021-05-06 PROCEDURE — 3008F BODY MASS INDEX DOCD: CPT | Mod: CPTII,S$GLB,, | Performed by: ORTHOPAEDIC SURGERY

## 2021-05-06 PROCEDURE — 1125F AMNT PAIN NOTED PAIN PRSNT: CPT | Mod: S$GLB,,, | Performed by: ORTHOPAEDIC SURGERY

## 2021-05-06 PROCEDURE — 99999 PR PBB SHADOW E&M-EST. PATIENT-LVL III: ICD-10-PCS | Mod: PBBFAC,,, | Performed by: ORTHOPAEDIC SURGERY

## 2021-05-07 ENCOUNTER — TELEPHONE (OUTPATIENT)
Dept: ORTHOPEDICS | Facility: CLINIC | Age: 41
End: 2021-05-07

## 2021-05-21 ENCOUNTER — TELEPHONE (OUTPATIENT)
Dept: ORTHOPEDICS | Facility: CLINIC | Age: 41
End: 2021-05-21

## 2021-05-26 ENCOUNTER — OFFICE VISIT (OUTPATIENT)
Dept: DERMATOLOGY | Facility: CLINIC | Age: 41
End: 2021-05-26
Payer: COMMERCIAL

## 2021-05-26 DIAGNOSIS — Z85.828 HISTORY OF BASAL CELL CARCINOMA: Primary | ICD-10-CM

## 2021-05-26 DIAGNOSIS — L73.9 FOLLICULITIS: ICD-10-CM

## 2021-05-26 PROCEDURE — 11900 INJECT SKIN LESIONS </W 7: CPT | Mod: S$GLB,,, | Performed by: STUDENT IN AN ORGANIZED HEALTH CARE EDUCATION/TRAINING PROGRAM

## 2021-05-26 PROCEDURE — 11900 PR INJECTION INTO SKIN LESIONS, UP TO 7: ICD-10-PCS | Mod: S$GLB,,, | Performed by: STUDENT IN AN ORGANIZED HEALTH CARE EDUCATION/TRAINING PROGRAM

## 2021-05-26 PROCEDURE — 99999 PR PBB SHADOW E&M-EST. PATIENT-LVL III: CPT | Mod: PBBFAC,,, | Performed by: STUDENT IN AN ORGANIZED HEALTH CARE EDUCATION/TRAINING PROGRAM

## 2021-05-26 PROCEDURE — 99203 OFFICE O/P NEW LOW 30 MIN: CPT | Mod: 25,S$GLB,, | Performed by: STUDENT IN AN ORGANIZED HEALTH CARE EDUCATION/TRAINING PROGRAM

## 2021-05-26 PROCEDURE — 99999 PR PBB SHADOW E&M-EST. PATIENT-LVL III: ICD-10-PCS | Mod: PBBFAC,,, | Performed by: STUDENT IN AN ORGANIZED HEALTH CARE EDUCATION/TRAINING PROGRAM

## 2021-05-26 PROCEDURE — 99203 PR OFFICE/OUTPT VISIT, NEW, LEVL III, 30-44 MIN: ICD-10-PCS | Mod: 25,S$GLB,, | Performed by: STUDENT IN AN ORGANIZED HEALTH CARE EDUCATION/TRAINING PROGRAM

## 2021-05-26 RX ORDER — IPRATROPIUM BROMIDE 21 UG/1
SPRAY, METERED NASAL
COMMUNITY
Start: 2021-01-04

## 2021-05-28 ENCOUNTER — HOSPITAL ENCOUNTER (OUTPATIENT)
Dept: RADIOLOGY | Facility: HOSPITAL | Age: 41
Discharge: HOME | End: 2021-05-28
Attending: ORTHOPAEDIC SURGERY
Payer: COMMERCIAL

## 2021-05-28 DIAGNOSIS — S73.192A TEAR OF LEFT ACETABULAR LABRUM, INITIAL ENCOUNTER: ICD-10-CM

## 2021-05-28 DIAGNOSIS — M25.559 HIP PAIN: ICD-10-CM

## 2021-05-28 DIAGNOSIS — M70.62 GREATER TROCHANTERIC BURSITIS OF LEFT HIP: ICD-10-CM

## 2021-05-28 PROCEDURE — 27093 INJECTION FOR HIP X-RAY: CPT

## 2021-05-28 PROCEDURE — 25500020 PHARM REV CODE 255: Performed by: ORTHOPAEDIC SURGERY

## 2021-05-28 PROCEDURE — 73722 MRI JOINT OF LWR EXTR W/DYE: CPT | Mod: TC,LT

## 2021-05-28 PROCEDURE — 27093 INJECTION FOR HIP X-RAY: CPT | Mod: ,,, | Performed by: RADIOLOGY

## 2021-05-28 PROCEDURE — 73525 CONTRAST X-RAY OF HIP: CPT | Mod: 26,LT,, | Performed by: RADIOLOGY

## 2021-05-28 PROCEDURE — A9585 GADOBUTROL INJECTION: HCPCS | Performed by: ORTHOPAEDIC SURGERY

## 2021-05-28 PROCEDURE — 27093 XR ARTHROGRAM HIP LEFT WITH MRI TO FOLLOW: ICD-10-PCS | Mod: ,,, | Performed by: RADIOLOGY

## 2021-05-28 PROCEDURE — 73722 MRI JOINT OF LWR EXTR W/DYE: CPT | Mod: 26,LT,, | Performed by: RADIOLOGY

## 2021-05-28 PROCEDURE — 73525 CONTRAST X-RAY OF HIP: CPT | Mod: TC,LT

## 2021-05-28 PROCEDURE — 73525 XR ARTHROGRAM HIP LEFT WITH MRI TO FOLLOW: ICD-10-PCS | Mod: 26,LT,, | Performed by: RADIOLOGY

## 2021-05-28 PROCEDURE — 73722 MRI ARTHROGRAM HIP LEFT: ICD-10-PCS | Mod: 26,LT,, | Performed by: RADIOLOGY

## 2021-05-28 RX ORDER — GADOBUTROL 604.72 MG/ML
0.1 INJECTION INTRAVENOUS
Status: COMPLETED | OUTPATIENT
Start: 2021-05-28 | End: 2021-05-28

## 2021-05-28 RX ADMIN — IOHEXOL 10 ML: 350 INJECTION, SOLUTION INTRAVENOUS at 01:05

## 2021-05-28 RX ADMIN — GADOBUTROL 0.1 ML: 604.72 INJECTION INTRAVENOUS at 01:05

## 2021-05-31 ENCOUNTER — OFFICE VISIT (OUTPATIENT)
Dept: ORTHOPEDICS | Facility: CLINIC | Age: 41
End: 2021-05-31
Payer: COMMERCIAL

## 2021-05-31 DIAGNOSIS — M25.852 LEFT HIP IMPINGEMENT SYNDROME: ICD-10-CM

## 2021-05-31 DIAGNOSIS — S73.192D TEAR OF LEFT ACETABULAR LABRUM, SUBSEQUENT ENCOUNTER: ICD-10-CM

## 2021-05-31 DIAGNOSIS — M25.851 RIGHT HIP IMPINGEMENT SYNDROME: Primary | ICD-10-CM

## 2021-05-31 DIAGNOSIS — S73.191D TEAR OF RIGHT ACETABULAR LABRUM, SUBSEQUENT ENCOUNTER: ICD-10-CM

## 2021-05-31 PROCEDURE — 1125F AMNT PAIN NOTED PAIN PRSNT: CPT | Mod: S$GLB,,, | Performed by: ORTHOPAEDIC SURGERY

## 2021-05-31 PROCEDURE — 99999 PR PBB SHADOW E&M-EST. PATIENT-LVL III: ICD-10-PCS | Mod: PBBFAC,,, | Performed by: ORTHOPAEDIC SURGERY

## 2021-05-31 PROCEDURE — 99999 PR PBB SHADOW E&M-EST. PATIENT-LVL III: CPT | Mod: PBBFAC,,, | Performed by: ORTHOPAEDIC SURGERY

## 2021-05-31 PROCEDURE — 1125F PR PAIN SEVERITY QUANTIFIED, PAIN PRESENT: ICD-10-PCS | Mod: S$GLB,,, | Performed by: ORTHOPAEDIC SURGERY

## 2021-05-31 PROCEDURE — 99214 OFFICE O/P EST MOD 30 MIN: CPT | Mod: S$GLB,,, | Performed by: ORTHOPAEDIC SURGERY

## 2021-05-31 PROCEDURE — 99214 PR OFFICE/OUTPT VISIT, EST, LEVL IV, 30-39 MIN: ICD-10-PCS | Mod: S$GLB,,, | Performed by: ORTHOPAEDIC SURGERY

## 2021-06-01 ENCOUNTER — TELEPHONE (OUTPATIENT)
Dept: ORTHOPEDICS | Facility: CLINIC | Age: 41
End: 2021-06-01

## 2021-06-03 ENCOUNTER — TELEPHONE (OUTPATIENT)
Dept: ORTHOPEDICS | Facility: CLINIC | Age: 41
End: 2021-06-03

## 2021-06-03 ENCOUNTER — PATIENT MESSAGE (OUTPATIENT)
Dept: ORTHOPEDICS | Facility: CLINIC | Age: 41
End: 2021-06-03

## 2021-07-14 ENCOUNTER — TELEPHONE (OUTPATIENT)
Dept: ORTHOPEDICS | Facility: CLINIC | Age: 41
End: 2021-07-14

## 2021-07-14 ENCOUNTER — PATIENT MESSAGE (OUTPATIENT)
Dept: ORTHOPEDICS | Facility: CLINIC | Age: 41
End: 2021-07-14

## 2021-09-01 ENCOUNTER — PATIENT MESSAGE (OUTPATIENT)
Dept: ORTHOPEDICS | Facility: CLINIC | Age: 41
End: 2021-09-01

## 2021-09-02 ENCOUNTER — OFFICE VISIT (OUTPATIENT)
Dept: ORTHOPEDICS | Facility: CLINIC | Age: 41
End: 2021-09-02
Payer: COMMERCIAL

## 2021-09-02 DIAGNOSIS — M25.851 RIGHT HIP IMPINGEMENT SYNDROME: ICD-10-CM

## 2021-09-02 DIAGNOSIS — M25.852 LEFT HIP IMPINGEMENT SYNDROME: ICD-10-CM

## 2021-09-02 DIAGNOSIS — S73.191D TEAR OF RIGHT ACETABULAR LABRUM, SUBSEQUENT ENCOUNTER: ICD-10-CM

## 2021-09-02 DIAGNOSIS — S73.192D TEAR OF LEFT ACETABULAR LABRUM, SUBSEQUENT ENCOUNTER: Primary | ICD-10-CM

## 2021-09-02 PROCEDURE — 99214 PR OFFICE/OUTPT VISIT, EST, LEVL IV, 30-39 MIN: ICD-10-PCS | Mod: S$GLB,,, | Performed by: ORTHOPAEDIC SURGERY

## 2021-09-02 PROCEDURE — 99214 OFFICE O/P EST MOD 30 MIN: CPT | Mod: S$GLB,,, | Performed by: ORTHOPAEDIC SURGERY

## 2021-09-02 PROCEDURE — 1159F PR MEDICATION LIST DOCUMENTED IN MEDICAL RECORD: ICD-10-PCS | Mod: CPTII,S$GLB,, | Performed by: ORTHOPAEDIC SURGERY

## 2021-09-02 PROCEDURE — 99999 PR PBB SHADOW E&M-EST. PATIENT-LVL III: ICD-10-PCS | Mod: PBBFAC,,, | Performed by: ORTHOPAEDIC SURGERY

## 2021-09-02 PROCEDURE — 1159F MED LIST DOCD IN RCRD: CPT | Mod: CPTII,S$GLB,, | Performed by: ORTHOPAEDIC SURGERY

## 2021-09-02 PROCEDURE — 99999 PR PBB SHADOW E&M-EST. PATIENT-LVL III: CPT | Mod: PBBFAC,,, | Performed by: ORTHOPAEDIC SURGERY

## 2022-03-09 ENCOUNTER — PATIENT MESSAGE (OUTPATIENT)
Dept: OBSTETRICS AND GYNECOLOGY | Facility: CLINIC | Age: 42
End: 2022-03-09
Payer: COMMERCIAL

## 2022-05-11 ENCOUNTER — TELEPHONE (OUTPATIENT)
Dept: OBSTETRICS AND GYNECOLOGY | Facility: CLINIC | Age: 42
End: 2022-05-11
Payer: COMMERCIAL

## 2022-05-11 ENCOUNTER — PATIENT MESSAGE (OUTPATIENT)
Dept: OBSTETRICS AND GYNECOLOGY | Facility: CLINIC | Age: 42
End: 2022-05-11
Payer: COMMERCIAL

## 2022-05-11 NOTE — TELEPHONE ENCOUNTER
Contacted pt to advise her appt needs to r/s due to the provider being out on 5/16/2022.    No answer, lvm to return call to clinic.

## 2022-08-05 ENCOUNTER — TELEPHONE (OUTPATIENT)
Dept: OBSTETRICS AND GYNECOLOGY | Facility: CLINIC | Age: 42
End: 2022-08-05
Payer: COMMERCIAL

## 2022-08-09 ENCOUNTER — OFFICE VISIT (OUTPATIENT)
Dept: OBSTETRICS AND GYNECOLOGY | Facility: CLINIC | Age: 42
End: 2022-08-09
Payer: COMMERCIAL

## 2022-08-09 ENCOUNTER — LAB VISIT (OUTPATIENT)
Dept: LAB | Facility: HOSPITAL | Age: 42
End: 2022-08-09
Payer: COMMERCIAL

## 2022-08-09 VITALS
BODY MASS INDEX: 36.19 KG/M2 | DIASTOLIC BLOOD PRESSURE: 80 MMHG | SYSTOLIC BLOOD PRESSURE: 135 MMHG | WEIGHT: 224.19 LBS

## 2022-08-09 DIAGNOSIS — R10.2 PELVIC PAIN: ICD-10-CM

## 2022-08-09 DIAGNOSIS — Z01.419 ENCOUNTER FOR ANNUAL ROUTINE GYNECOLOGICAL EXAMINATION: Primary | ICD-10-CM

## 2022-08-09 DIAGNOSIS — Z12.4 CERVICAL CANCER SCREENING: ICD-10-CM

## 2022-08-09 DIAGNOSIS — Z85.3 HISTORY OF BREAST CANCER: ICD-10-CM

## 2022-08-09 PROCEDURE — 87624 HPV HI-RISK TYP POOLED RSLT: CPT

## 2022-08-09 PROCEDURE — 99386 PREV VISIT NEW AGE 40-64: CPT | Mod: S$GLB,,,

## 2022-08-09 PROCEDURE — 3008F PR BODY MASS INDEX (BMI) DOCUMENTED: ICD-10-PCS | Mod: CPTII,S$GLB,,

## 2022-08-09 PROCEDURE — 3008F BODY MASS INDEX DOCD: CPT | Mod: CPTII,S$GLB,,

## 2022-08-09 PROCEDURE — 99999 PR PBB SHADOW E&M-EST. PATIENT-LVL III: ICD-10-PCS | Mod: PBBFAC,,,

## 2022-08-09 PROCEDURE — 3079F PR MOST RECENT DIASTOLIC BLOOD PRESSURE 80-89 MM HG: ICD-10-PCS | Mod: CPTII,S$GLB,,

## 2022-08-09 PROCEDURE — 1160F PR REVIEW ALL MEDS BY PRESCRIBER/CLIN PHARMACIST DOCUMENTED: ICD-10-PCS | Mod: CPTII,S$GLB,,

## 2022-08-09 PROCEDURE — 3079F DIAST BP 80-89 MM HG: CPT | Mod: CPTII,S$GLB,,

## 2022-08-09 PROCEDURE — 99999 PR PBB SHADOW E&M-EST. PATIENT-LVL III: CPT | Mod: PBBFAC,,,

## 2022-08-09 PROCEDURE — 1159F MED LIST DOCD IN RCRD: CPT | Mod: CPTII,S$GLB,,

## 2022-08-09 PROCEDURE — 81162 BRCA1&2 GEN FULL SEQ DUP/DEL: CPT

## 2022-08-09 PROCEDURE — 1159F PR MEDICATION LIST DOCUMENTED IN MEDICAL RECORD: ICD-10-PCS | Mod: CPTII,S$GLB,,

## 2022-08-09 PROCEDURE — 88175 CYTOPATH C/V AUTO FLUID REDO: CPT

## 2022-08-09 PROCEDURE — 3075F PR MOST RECENT SYSTOLIC BLOOD PRESS GE 130-139MM HG: ICD-10-PCS | Mod: CPTII,S$GLB,,

## 2022-08-09 PROCEDURE — 36415 COLL VENOUS BLD VENIPUNCTURE: CPT

## 2022-08-09 PROCEDURE — 3075F SYST BP GE 130 - 139MM HG: CPT | Mod: CPTII,S$GLB,,

## 2022-08-09 PROCEDURE — 1160F RVW MEDS BY RX/DR IN RCRD: CPT | Mod: CPTII,S$GLB,,

## 2022-08-09 PROCEDURE — 99386 PR PREVENTIVE VISIT,NEW,40-64: ICD-10-PCS | Mod: S$GLB,,,

## 2022-08-09 NOTE — PROGRESS NOTES
Subjective:       Patient ID: Selena Spring is a 41 y.o. female.    Chief Complaint:  Annual Exam      History of Present Illness  HPI  Annual Exam-Premenopausal  Patient presents for annual exam. The patient is not sexually active, states it has been many years since sexual intercourse. GYN screening history: last pap: was normal. The patient wears seatbelts: yes. The patient participates in regular exercise: yes. Has the patient ever been transfused or tattooed?: yes, tattoos. The patient reports that there is not domestic violence in her life.    Patient doesn't recall date of last pap smear but knows that it was normal.   Patient has a history of irregular cycles  Cycles will come every other month, some months are lighter and some months are heavy with severe cramping (doubled over in pain)  Has a family history of multiple GYN cancers (ovarian, breast, and endometrial cancer)  Does no desire fertility or BC to help regulate cycles, would prefer to have hysterectomy. States both her mother and grandmother had to have hysterectomies at her age.     GYN & OB History  Patient's last menstrual period was 07/26/2022.   Date of Last Pap: 8/9/2022    OB History   No obstetric history on file.       Review of Systems  Review of Systems   Constitutional: Negative.    HENT: Negative.    Eyes: Negative.    Respiratory: Negative.    Cardiovascular: Negative.    Gastrointestinal: Negative.    Genitourinary: Positive for dysmenorrhea, menstrual problem and pelvic pain.   Musculoskeletal: Negative.    Integumentary:  Negative.   Neurological: Negative.    Hematological: Negative.    Psychiatric/Behavioral: Negative.    All other systems reviewed and are negative.  Breast: negative.            Objective:      Physical Exam:   Constitutional: She is oriented to person, place, and time. She appears well-developed and well-nourished.    HENT:   Head: Normocephalic and atraumatic.   Nose: Nose normal.    Eyes: Pupils are  equal, round, and reactive to light. Conjunctivae and EOM are normal.     Cardiovascular: Normal rate and regular rhythm.     Pulmonary/Chest: Effort normal. She has no decreased breath sounds. She has no rhonchi. Right breast exhibits no inverted nipple, no mass, no nipple discharge, no tenderness and no bleeding. Left breast exhibits no inverted nipple, no mass, no nipple discharge, no tenderness and no bleeding. Breasts are symmetrical.        Abdominal: Soft. There is no abdominal tenderness.     Genitourinary:    Inguinal canal, vagina, uterus, right adnexa, left adnexa and rectum normal.      Pelvic exam was performed with patient supine.   The external female genitalia was normal.   No external genitalia lesions identified,Genitalia hair distrobution normal .   Labial bartholins normal.Cervix is normal. Right adnexum displays no mass and no tenderness. Left adnexum displays no mass and no tenderness. No  no vaginal discharge, tenderness or bleeding in the vagina. Vagina was moist.Cervix exhibits no motion tenderness, no discharge and no tenderness.    pap smear completedUerus contour normal  Uterus is not tender. Uterus size: 8 cm.Normal urethral meatus.          Musculoskeletal: Normal range of motion and moves all extremeties.       Neurological: She is alert and oriented to person, place, and time.    Skin: Skin is warm and dry.    Psychiatric: She has a normal mood and affect. Her speech is normal and behavior is normal. Mood, judgment and thought content normal.             Assessment:        1. Encounter for annual routine gynecological examination    2. Cervical cancer screening    3. Pelvic pain    4. History of breast cancer               Plan:   Continue annual well woman exam.  Pap today. Patient was counseled today on ASCCP screening guidelines (pap smear every 3 years in low risk patients) and recommendations for annual pelvic exams and clinical breast exams, yearly mammograms starting at age 40;  and to see her PCP for other healthcare maintenance.   Mammo ordered by PCP  Continue menstrual calender   Refer to MD for hyst consult     Diagnosis and orders this visit:  Encounter for annual routine gynecological examination    Cervical cancer screening  -     Liquid-Based Pap Smear, Screening  -     HPV High Risk Genotypes, PCR    Pelvic pain  -     US Pelvis Complete Non OB; Future; Expected date: 08/09/2022    History of breast cancer  -     Comprehensive BRCA 1/2 Analysis; Future; Expected date: 08/09/2022          Cynthia Fregoso NP

## 2022-08-11 ENCOUNTER — HOSPITAL ENCOUNTER (OUTPATIENT)
Dept: RADIOLOGY | Facility: HOSPITAL | Age: 42
Discharge: HOME | End: 2022-08-11
Payer: COMMERCIAL

## 2022-08-11 DIAGNOSIS — R10.2 PELVIC PAIN: ICD-10-CM

## 2022-08-11 PROCEDURE — 76856 US EXAM PELVIC COMPLETE: CPT | Mod: TC

## 2022-08-11 PROCEDURE — 76856 US EXAM PELVIC COMPLETE: CPT | Mod: 26,,, | Performed by: RADIOLOGY

## 2022-08-11 PROCEDURE — 76856 US PELVIS COMPLETE NON OB: ICD-10-PCS | Mod: 26,,, | Performed by: RADIOLOGY

## 2022-08-17 ENCOUNTER — OFFICE VISIT (OUTPATIENT)
Dept: DERMATOLOGY | Facility: CLINIC | Age: 42
End: 2022-08-17
Payer: COMMERCIAL

## 2022-08-17 DIAGNOSIS — L30.9 ECZEMA, UNSPECIFIED TYPE: Primary | ICD-10-CM

## 2022-08-17 DIAGNOSIS — L82.1 SEBORRHEIC KERATOSES: ICD-10-CM

## 2022-08-17 DIAGNOSIS — D23.9 DERMATOFIBROMA: ICD-10-CM

## 2022-08-17 DIAGNOSIS — D22.9 MULTIPLE BENIGN NEVI: ICD-10-CM

## 2022-08-17 DIAGNOSIS — L72.0 EPIDERMAL INCLUSION CYST: ICD-10-CM

## 2022-08-17 PROCEDURE — 99214 PR OFFICE/OUTPT VISIT, EST, LEVL IV, 30-39 MIN: ICD-10-PCS | Mod: S$GLB,,, | Performed by: STUDENT IN AN ORGANIZED HEALTH CARE EDUCATION/TRAINING PROGRAM

## 2022-08-17 PROCEDURE — 99999 PR PBB SHADOW E&M-EST. PATIENT-LVL III: ICD-10-PCS | Mod: PBBFAC,,, | Performed by: STUDENT IN AN ORGANIZED HEALTH CARE EDUCATION/TRAINING PROGRAM

## 2022-08-17 PROCEDURE — 1160F PR REVIEW ALL MEDS BY PRESCRIBER/CLIN PHARMACIST DOCUMENTED: ICD-10-PCS | Mod: CPTII,S$GLB,, | Performed by: STUDENT IN AN ORGANIZED HEALTH CARE EDUCATION/TRAINING PROGRAM

## 2022-08-17 PROCEDURE — 1159F PR MEDICATION LIST DOCUMENTED IN MEDICAL RECORD: ICD-10-PCS | Mod: CPTII,S$GLB,, | Performed by: STUDENT IN AN ORGANIZED HEALTH CARE EDUCATION/TRAINING PROGRAM

## 2022-08-17 PROCEDURE — 1160F RVW MEDS BY RX/DR IN RCRD: CPT | Mod: CPTII,S$GLB,, | Performed by: STUDENT IN AN ORGANIZED HEALTH CARE EDUCATION/TRAINING PROGRAM

## 2022-08-17 PROCEDURE — 99999 PR PBB SHADOW E&M-EST. PATIENT-LVL III: CPT | Mod: PBBFAC,,, | Performed by: STUDENT IN AN ORGANIZED HEALTH CARE EDUCATION/TRAINING PROGRAM

## 2022-08-17 PROCEDURE — 1159F MED LIST DOCD IN RCRD: CPT | Mod: CPTII,S$GLB,, | Performed by: STUDENT IN AN ORGANIZED HEALTH CARE EDUCATION/TRAINING PROGRAM

## 2022-08-17 PROCEDURE — 99214 OFFICE O/P EST MOD 30 MIN: CPT | Mod: S$GLB,,, | Performed by: STUDENT IN AN ORGANIZED HEALTH CARE EDUCATION/TRAINING PROGRAM

## 2022-08-17 RX ORDER — CLINDAMYCIN AND BENZOYL PEROXIDE 10; 50 MG/G; MG/G
GEL TOPICAL 2 TIMES DAILY
Qty: 25 G | Refills: 5 | Status: SHIPPED | OUTPATIENT
Start: 2022-08-17 | End: 2024-02-09 | Stop reason: SDUPTHER

## 2022-08-17 RX ORDER — UREA 40 %
CREAM (GRAM) TOPICAL DAILY
Qty: 198 G | Refills: 5 | Status: SHIPPED | OUTPATIENT
Start: 2022-08-17

## 2022-08-17 NOTE — PROGRESS NOTES
Patient Information  Name: Selena Spring  : 1980  MRN: [id number]     Referring Physician:  Dr. Brown ref. provider found   Primary Care Physician:   Primary Doctor Stephanie   Date of Visit: 2022      Subjective:       Selena Spring is a 41 y.o. female who presents for   Chief Complaint   Patient presents with    Eczema     Medication refill. Urea cream 40% , clindamycin, benzoyl cream     Mole     In groin. X years. Patient wants it looked it at.     Lesion     On lower leg and foot. X years.     HPI  Patient with new complaint of lesion(s)  Location: groin, lower leg, foot  Duration: years  Symptoms: none  Relieving factors/Previous treatments: none    She also has hx of eczema, overall stable, would like refills on urea and benzaclin gel.    Patient was last seen:Visit date not found     Prior notes by myself reviewed.   Clinical documentation obtained by nursing staff reviewed.    Review of Systems   Skin: Negative for itching and rash.        Objective:    Physical Exam   Constitutional: She appears well-developed and well-nourished. No distress.   Neurological: She is alert and oriented to person, place, and time. She is not disoriented.   Psychiatric: She has a normal mood and affect.   Skin:   Areas Examined (abnormalities noted in diagram):   Genitals / Buttocks / Groin Inspection Performed  Back Inspection Performed  RLE Inspected  LLE Inspection Performed              Diagram Legend     Erythematous scaling macule/papule c/w actinic keratosis       Vascular papule c/w angioma      Pigmented verrucoid papule/plaque c/w seborrheic keratosis      Yellow umbilicated papule c/w sebaceous hyperplasia      Irregularly shaped tan macule c/w lentigo     1-2 mm smooth white papules consistent with Milia      Movable subcutaneous cyst with punctum c/w epidermal inclusion cyst      Subcutaneous movable cyst c/w pilar cyst      Firm pink to brown papule c/w dermatofibroma      Pedunculated  fleshy papule(s) c/w skin tag(s)      Evenly pigmented macule c/w junctional nevus     Mildly variegated pigmented, slightly irregular-bordered macule c/w mildly atypical nevus      Flesh colored to evenly pigmented papule c/w intradermal nevus       Pink pearly papule/plaque c/w basal cell carcinoma      Erythematous hyperkeratotic cursted plaque c/w SCC      Surgical scar with no sign of skin cancer recurrence      Open and closed comedones      Inflammatory papules and pustules      Verrucoid papule consistent consistent with wart     Erythematous eczematous patches and plaques     Dystrophic onycholytic nail with subungual debris c/w onychomycosis     Umbilicated papule    Erythematous-base heme-crusted tan verrucoid plaque consistent with inflamed seborrheic keratosis     Erythematous Silvery Scaling Plaque c/w Psoriasis     See annotation      No images are attached to the encounter or orders placed in the encounter.    [] Data reviewed  [] Independent review of test  [] Management discussed with another provider    Assessment / Plan:        Eczema, unspecified type  -     urea (CARMOL) 40 % Crea; Apply topically once daily.  Dispense: 198 g; Refill: 5  -     clindamycin-benzoyl peroxide (BENZACLIN) gel; Apply topically 2 (two) times daily.  Dispense: 25 g; Refill: 5    Epidermal inclusion cyst  - Patient to call if interested in removal    Multiple benign nevi  Discussed ABCDE's of nevi.  Monitor for new mole or moles that are becoming bigger, darker, irritated, or developing irregular borders. Brochure provided. Instructed patient to observe lesion(s) for changes and follow up in clinic if changes are noted. Patient to monitor skin at home for new or changing lesions.     Seborrheic keratoses  These are benign inherited growths without a malignant potential. Reassurance given to patient. No treatment is necessary.     Dermatofibroma  This is a benign scar-like lesion secondary to minor trauma. No treatment  required.              LOS NUMBER AND COMPLEXITY OF PROBLEMS    COMPLEXITY OF DATA RISK TOTAL TIME (m)   24717  03901 [] 1 self-limited or minor problem [x] Minimal to none [] No treatment recommended or patient to monitor 15-29  10-19   34477  81237 Low  [] 2 or > self limited or minor problems  [] 1 stable chronic illness  [] 1 acute, uncomplicated illness or injury Limited (2)  [] Prior external notes from each unique source  [] Review result of each unique test  [] Order each unique test []  Low  OTC medications, minor skin biopsy 30-44  20-29   54015  12217 Moderate  []  1 or > chronic illness with progression, exacerbation or SE of treatment  [x]  2 or more stable chronic illnesses  []  1 acute illness with systemic symptoms  []  1 acute complicated injury  []  1 undiagnosed new problem with uncertain prognosis Moderate (1/3 below)  []  3 or more data items        *Now includes assessment requiring independent historian  []  Independent interpretation of a test  []  Discuss management/test with another provider Moderate  [x]  Prescription drug mgmt  []  Minor surgery with risk discussed  []  Mgmt limited by social determinates 45-59  30-39   03971  77452 High  []  1 or more chronic illness with severe exacerbation, progression or SE of treatment  []  1 acute or chronic illness/injury that poses a threat to life or bodily function Extensive (2/3 below)  []  3 or more data items        *Now includes assessment requiring independent historian.  []  Independent interpretation of a test  []  Discuss management/test with another provider High  []  Major surgery with risk discussed  []  Drug therapy requiring intensive monitoring for toxicity  []  Hospitalization  []  Decision for DNR 60-74  40-54      Follow up in about 1 year (around 8/17/2023).    Jyoti Swain MD, FAAD  [facility]

## 2022-09-11 ENCOUNTER — PATIENT MESSAGE (OUTPATIENT)
Dept: OBSTETRICS AND GYNECOLOGY | Facility: CLINIC | Age: 42
End: 2022-09-11
Payer: COMMERCIAL

## 2022-09-16 LAB
ANNOTATION COMMENT IMP: NORMAL
BRCA RESOUCES: NORMAL
GENE STUDIED ID: NORMAL
GENETIC VARIANT DETAILS BLD/T: NORMAL
GENETICIST REVIEW: NORMAL
LAB TEST METHOD: NORMAL
MOL DX INTERP BLD/T QL: NEGATIVE
PROVIDER SIGNING NAME: NORMAL
SPECIMEN SOURCE: NORMAL
SPECIMEN SOURCE: NORMAL
TEST PERFORMANCE INFO SPEC: NORMAL
TEST PERFORMANCE INFO SPEC: NORMAL

## 2024-02-09 ENCOUNTER — OFFICE VISIT (OUTPATIENT)
Dept: DERMATOLOGY | Facility: CLINIC | Age: 44
End: 2024-02-09
Payer: COMMERCIAL

## 2024-02-09 DIAGNOSIS — L30.9 ECZEMA, UNSPECIFIED TYPE: ICD-10-CM

## 2024-02-09 DIAGNOSIS — L72.0 MILIA: Primary | ICD-10-CM

## 2024-02-09 PROCEDURE — 99214 OFFICE O/P EST MOD 30 MIN: CPT | Mod: S$GLB,,, | Performed by: STUDENT IN AN ORGANIZED HEALTH CARE EDUCATION/TRAINING PROGRAM

## 2024-02-09 PROCEDURE — 99999 PR PBB SHADOW E&M-EST. PATIENT-LVL III: CPT | Mod: PBBFAC,,, | Performed by: STUDENT IN AN ORGANIZED HEALTH CARE EDUCATION/TRAINING PROGRAM

## 2024-02-09 PROCEDURE — 1160F RVW MEDS BY RX/DR IN RCRD: CPT | Mod: CPTII,S$GLB,, | Performed by: STUDENT IN AN ORGANIZED HEALTH CARE EDUCATION/TRAINING PROGRAM

## 2024-02-09 PROCEDURE — 1159F MED LIST DOCD IN RCRD: CPT | Mod: CPTII,S$GLB,, | Performed by: STUDENT IN AN ORGANIZED HEALTH CARE EDUCATION/TRAINING PROGRAM

## 2024-02-09 RX ORDER — CLINDAMYCIN AND BENZOYL PEROXIDE 10; 50 MG/G; MG/G
GEL TOPICAL 2 TIMES DAILY
Qty: 50 G | Refills: 11 | Status: SHIPPED | OUTPATIENT
Start: 2024-02-09

## 2024-02-09 NOTE — PROGRESS NOTES
Patient Information  Name: Selena Spring  : 1980  MRN: [id number]     Referring Physician:  Dr. Brown ref. provider found   Primary Care Physician:  Dr. Brown, Primary Doctor   Date of Visit: 2024      Subjective:       Selena Spring is a 43 y.o. female who presents for   Chief Complaint   Patient presents with    Eczema     Medication refills    Lesion     C/o lesion on right eye       Hx of eczema, here for follow up. She has been using benzaclin with stable condition. Also using topical TAC prn.     Patient with new complaint of lesion(s)  Location: right upper eyelid  Duration: 1 year  Symptoms: none  Relieving factors/Previous treatments: none        Patient was last seen:Visit date not found     Prior notes by myself reviewed.   Clinical documentation obtained by nursing staff reviewed.    Review of Systems   Skin:  Negative for itching and rash.        Objective:    Physical Exam   Constitutional: She appears well-developed and well-nourished. No distress.   Neurological: She is alert and oriented to person, place, and time. She is not disoriented.   Psychiatric: She has a normal mood and affect.   Skin:   Areas Examined (abnormalities noted in diagram):   Head / Face Inspection Performed  Neck Inspection Performed  RUE Inspected  LUE Inspection Performed              Diagram Legend     Erythematous scaling macule/papule c/w actinic keratosis       Vascular papule c/w angioma      Pigmented verrucoid papule/plaque c/w seborrheic keratosis      Yellow umbilicated papule c/w sebaceous hyperplasia      Irregularly shaped tan macule c/w lentigo     1-2 mm smooth white papules consistent with Milia      Movable subcutaneous cyst with punctum c/w epidermal inclusion cyst      Subcutaneous movable cyst c/w pilar cyst      Firm pink to brown papule c/w dermatofibroma      Pedunculated fleshy papule(s) c/w skin tag(s)      Evenly pigmented macule c/w junctional nevus     Mildly variegated  pigmented, slightly irregular-bordered macule c/w mildly atypical nevus      Flesh colored to evenly pigmented papule c/w intradermal nevus       Pink pearly papule/plaque c/w basal cell carcinoma      Erythematous hyperkeratotic cursted plaque c/w SCC      Surgical scar with no sign of skin cancer recurrence      Open and closed comedones      Inflammatory papules and pustules      Verrucoid papule consistent consistent with wart     Erythematous eczematous patches and plaques     Dystrophic onycholytic nail with subungual debris c/w onychomycosis     Umbilicated papule    Erythematous-base heme-crusted tan verrucoid plaque consistent with inflamed seborrheic keratosis     Erythematous Silvery Scaling Plaque c/w Psoriasis     See annotation      No images are attached to the encounter or orders placed in the encounter.    [] Data reviewed  [] Independent review of test  [] Management discussed with another provider    Assessment / Plan:        Milia  Reassurance given to patient. No treatment is necessary.     Eczema, unspecified type  -     clindamycin-benzoyl peroxide (BENZACLIN) gel; Apply topically 2 (two) times daily.  Dispense: 50 g; Refill: 11  - She will message me if needs refills of TAC topical/urea             LOS NUMBER AND COMPLEXITY OF PROBLEMS    COMPLEXITY OF DATA RISK TOTAL TIME (m)   79698  [id number] [] 1 self-limited or minor problem [x] Minimal to none [] No treatment recommended or patient to monitor 15-29  10-19   52185  10738 Low  [] 2 or > self limited or minor problems  [] 1 stable chronic illness  [] 1 acute, uncomplicated illness or injury Limited (2)  [] Prior external notes from each unique source  [] Review result of each unique test  [] Order each unique test []  Low  OTC medications, minor skin biopsy 30-44 20-29   70287  57491 Moderate  []  1 or > chronic illness with progression, exacerbation or SE of treatment  [x]  2 or more stable chronic illnesses  []  1 acute illness with  systemic symptoms  []  1 acute complicated injury  []  1 undiagnosed new problem with uncertain prognosis Moderate (1/3 below)  []  3 or more data items        *Now includes assessment requiring independent historian  []  Independent interpretation of a test  []  Discuss management/test with another provider Moderate  [x]  Prescription drug mgmt  []  Minor surgery with risk discussed  []  Mgmt limited by social determinates 45-59  30-39   45211  65390 High  []  1 or more chronic illness with severe exacerbation, progression or SE of treatment  []  1 acute or chronic illness/injury that poses a threat to life or bodily function Extensive (2/3 below)  []  3 or more data items        *Now includes assessment requiring independent historian.  []  Independent interpretation of a test  []  Discuss management/test with another provider High  []  Major surgery with risk discussed  []  Drug therapy requiring intensive monitoring for toxicity  []  Hospitalization  []  Decision for DNR 60-74  40-54      No follow-ups on file.    Jyoti Swain MD, FAAD  [facility]

## 2024-03-13 ENCOUNTER — LAB VISIT (OUTPATIENT)
Dept: LAB | Facility: HOSPITAL | Age: 44
End: 2024-03-13
Attending: STUDENT IN AN ORGANIZED HEALTH CARE EDUCATION/TRAINING PROGRAM
Payer: COMMERCIAL

## 2024-03-13 ENCOUNTER — OFFICE VISIT (OUTPATIENT)
Dept: ALLERGY | Facility: CLINIC | Age: 44
End: 2024-03-13
Payer: COMMERCIAL

## 2024-03-13 VITALS
SYSTOLIC BLOOD PRESSURE: 136 MMHG | BODY MASS INDEX: 38.97 KG/M2 | DIASTOLIC BLOOD PRESSURE: 89 MMHG | TEMPERATURE: 98 F | HEART RATE: 82 BPM | OXYGEN SATURATION: 98 % | WEIGHT: 242.5 LBS | HEIGHT: 66 IN

## 2024-03-13 DIAGNOSIS — R09.82 PND (POST-NASAL DRIP): ICD-10-CM

## 2024-03-13 DIAGNOSIS — R06.02 SHORTNESS OF BREATH: ICD-10-CM

## 2024-03-13 DIAGNOSIS — J31.0 CHRONIC RHINITIS: Primary | ICD-10-CM

## 2024-03-13 DIAGNOSIS — J32.9 RECURRENT SINUS INFECTIONS: ICD-10-CM

## 2024-03-13 DIAGNOSIS — J31.0 CHRONIC RHINITIS: ICD-10-CM

## 2024-03-13 LAB
BASOPHILS # BLD AUTO: 0.04 K/UL (ref 0–0.2)
BASOPHILS NFR BLD: 0.3 % (ref 0–1.9)
DIFFERENTIAL METHOD BLD: ABNORMAL
EOSINOPHIL # BLD AUTO: 0.1 K/UL (ref 0–0.5)
EOSINOPHIL NFR BLD: 0.6 % (ref 0–8)
ERYTHROCYTE [DISTWIDTH] IN BLOOD BY AUTOMATED COUNT: 12 % (ref 11.5–14.5)
HCT VFR BLD AUTO: 45.1 % (ref 37–48.5)
HGB BLD-MCNC: 14.2 G/DL (ref 12–16)
HIV 1+2 AB+HIV1 P24 AG SERPL QL IA: NORMAL
IGA SERPL-MCNC: 87 MG/DL (ref 40–350)
IGE SERPL-ACNC: <35 IU/ML (ref 0–100)
IGG SERPL-MCNC: 555 MG/DL (ref 650–1600)
IGM SERPL-MCNC: 66 MG/DL (ref 50–300)
IMM GRANULOCYTES # BLD AUTO: 0.04 K/UL (ref 0–0.04)
IMM GRANULOCYTES NFR BLD AUTO: 0.3 % (ref 0–0.5)
LYMPHOCYTES # BLD AUTO: 3.1 K/UL (ref 1–4.8)
LYMPHOCYTES NFR BLD: 26.4 % (ref 18–48)
MCH RBC QN AUTO: 28.3 PG (ref 27–31)
MCHC RBC AUTO-ENTMCNC: 31.5 G/DL (ref 32–36)
MCV RBC AUTO: 90 FL (ref 82–98)
MONOCYTES # BLD AUTO: 0.9 K/UL (ref 0.3–1)
MONOCYTES NFR BLD: 7.6 % (ref 4–15)
NEUTROPHILS # BLD AUTO: 7.5 K/UL (ref 1.8–7.7)
NEUTROPHILS NFR BLD: 64.8 % (ref 38–73)
NRBC BLD-RTO: 0 /100 WBC
PLATELET # BLD AUTO: 238 K/UL (ref 150–450)
PMV BLD AUTO: 11.8 FL (ref 9.2–12.9)
RBC # BLD AUTO: 5.01 M/UL (ref 4–5.4)
WBC # BLD AUTO: 11.62 K/UL (ref 3.9–12.7)

## 2024-03-13 PROCEDURE — 99204 OFFICE O/P NEW MOD 45 MIN: CPT | Mod: 25,S$GLB,, | Performed by: STUDENT IN AN ORGANIZED HEALTH CARE EDUCATION/TRAINING PROGRAM

## 2024-03-13 PROCEDURE — 82784 ASSAY IGA/IGD/IGG/IGM EACH: CPT | Mod: 59 | Performed by: STUDENT IN AN ORGANIZED HEALTH CARE EDUCATION/TRAINING PROGRAM

## 2024-03-13 PROCEDURE — 82785 ASSAY OF IGE: CPT | Mod: 59 | Performed by: STUDENT IN AN ORGANIZED HEALTH CARE EDUCATION/TRAINING PROGRAM

## 2024-03-13 PROCEDURE — 36415 COLL VENOUS BLD VENIPUNCTURE: CPT | Performed by: STUDENT IN AN ORGANIZED HEALTH CARE EDUCATION/TRAINING PROGRAM

## 2024-03-13 PROCEDURE — 3075F SYST BP GE 130 - 139MM HG: CPT | Mod: CPTII,S$GLB,, | Performed by: STUDENT IN AN ORGANIZED HEALTH CARE EDUCATION/TRAINING PROGRAM

## 2024-03-13 PROCEDURE — 82785 ASSAY OF IGE: CPT | Mod: 91 | Performed by: STUDENT IN AN ORGANIZED HEALTH CARE EDUCATION/TRAINING PROGRAM

## 2024-03-13 PROCEDURE — 3008F BODY MASS INDEX DOCD: CPT | Mod: CPTII,S$GLB,, | Performed by: STUDENT IN AN ORGANIZED HEALTH CARE EDUCATION/TRAINING PROGRAM

## 2024-03-13 PROCEDURE — 3079F DIAST BP 80-89 MM HG: CPT | Mod: CPTII,S$GLB,, | Performed by: STUDENT IN AN ORGANIZED HEALTH CARE EDUCATION/TRAINING PROGRAM

## 2024-03-13 PROCEDURE — 90732 PPSV23 VACC 2 YRS+ SUBQ/IM: CPT | Mod: S$GLB,,, | Performed by: STUDENT IN AN ORGANIZED HEALTH CARE EDUCATION/TRAINING PROGRAM

## 2024-03-13 PROCEDURE — 94010 BREATHING CAPACITY TEST: CPT | Mod: S$GLB,,, | Performed by: STUDENT IN AN ORGANIZED HEALTH CARE EDUCATION/TRAINING PROGRAM

## 2024-03-13 PROCEDURE — 82784 ASSAY IGA/IGD/IGG/IGM EACH: CPT | Performed by: STUDENT IN AN ORGANIZED HEALTH CARE EDUCATION/TRAINING PROGRAM

## 2024-03-13 PROCEDURE — 85025 COMPLETE CBC W/AUTO DIFF WBC: CPT | Performed by: STUDENT IN AN ORGANIZED HEALTH CARE EDUCATION/TRAINING PROGRAM

## 2024-03-13 PROCEDURE — 86317 IMMUNOASSAY INFECTIOUS AGENT: CPT | Mod: 59 | Performed by: STUDENT IN AN ORGANIZED HEALTH CARE EDUCATION/TRAINING PROGRAM

## 2024-03-13 PROCEDURE — 90471 IMMUNIZATION ADMIN: CPT | Mod: S$GLB,,, | Performed by: STUDENT IN AN ORGANIZED HEALTH CARE EDUCATION/TRAINING PROGRAM

## 2024-03-13 PROCEDURE — 1159F MED LIST DOCD IN RCRD: CPT | Mod: CPTII,S$GLB,, | Performed by: STUDENT IN AN ORGANIZED HEALTH CARE EDUCATION/TRAINING PROGRAM

## 2024-03-13 PROCEDURE — 87389 HIV-1 AG W/HIV-1&-2 AB AG IA: CPT | Performed by: STUDENT IN AN ORGANIZED HEALTH CARE EDUCATION/TRAINING PROGRAM

## 2024-03-13 PROCEDURE — 99999 PR PBB SHADOW E&M-EST. PATIENT-LVL V: CPT | Mod: PBBFAC,,, | Performed by: STUDENT IN AN ORGANIZED HEALTH CARE EDUCATION/TRAINING PROGRAM

## 2024-03-13 RX ORDER — MONTELUKAST SODIUM 10 MG/1
10 TABLET ORAL NIGHTLY
COMMUNITY

## 2024-03-13 RX ORDER — FLUTICASONE PROPIONATE 50 MCG
1 SPRAY, SUSPENSION (ML) NASAL 2 TIMES DAILY
Qty: 16 G | Refills: 11 | Status: SHIPPED | OUTPATIENT
Start: 2024-03-13 | End: 2025-03-13

## 2024-03-13 RX ORDER — AZELASTINE 1 MG/ML
1 SPRAY, METERED NASAL 2 TIMES DAILY
Qty: 30 ML | Refills: 11 | Status: SHIPPED | OUTPATIENT
Start: 2024-03-13 | End: 2025-03-13

## 2024-03-13 RX ORDER — AMOXICILLIN AND CLAVULANATE POTASSIUM 875; 125 MG/1; MG/1
1 TABLET, FILM COATED ORAL
COMMUNITY
End: 2024-05-21

## 2024-03-13 RX ORDER — LEVALBUTEROL TARTRATE 45 UG/1
1-2 AEROSOL, METERED ORAL EVERY 4 HOURS PRN
COMMUNITY

## 2024-03-13 RX ORDER — METHYLPREDNISOLONE 4 MG/1
4 TABLET ORAL DAILY
COMMUNITY
End: 2024-05-21

## 2024-03-13 RX ORDER — DESLORATADINE 5 MG/1
5 TABLET ORAL DAILY
COMMUNITY

## 2024-03-13 NOTE — ASSESSMENT & PLAN NOTE
- Not controlled at this time   - Ordered Flonase 1 SEN BID   - Ordered Astelin 1 SEN BID   - Educated on proper use of intranasal spray  - Ordered Serum IgE to Zone 6 Aeroallergens   - Will continue to monitor and reassess

## 2024-03-13 NOTE — PROGRESS NOTES
Allergy and Immunology  New Patient Clinic Note    Date: 3/13/2024  Chief Complaint   Patient presents with    Allergies    Breathing Problem     Pt states that she has been having breathing problems for years and have been trying to manage it with her PCP but no improvement: also pt states she has allergies.      Referred by: Self, Aaareferral  No address on file    History  Selena Spring is a 43 y.o. female being seen as a New Patient today.    Chronic Rhinitis   - Onset: Multiple year hx   - Symptoms: Congestion, rhinorrhea, PND   - Suspected triggers include:   - Pattern: Perennial  - Medications: Mucinex at this time     Chronic or Inducible Urticaria  - No hx of chronic urticaria     Mild Intermittent Asthma   - Recently prescribed albuterol   - Symptoms appear to be upper resp/PND   - Spirometry normal at this appointment     CRSwNP  - No hx of CRSwNP     Eczema   - No hx of eczema     Eosinophilic Esophagitis  - No hx of eosinophilic esophagitis     Food Allergy  - No hx of food allergy     Drug Allergy  - No hx of drug allergy     Recurrent Infections  - No hx of recurrent infections     Venom Allergy  - No hx of venom allergy     Allergies, PMH, PSH, Social, and Family History were reviewed.    Review of patient's allergies indicates:   Allergen Reactions    Sulfa (sulfonamide antibiotics) Hives      No past medical history on file.  Past Surgical History:   Procedure Laterality Date    INJECTION OF JOINT Right 3/10/2021    Procedure: Right IA Hip Joint injection - per orthopedics [place];  Surgeon: Renny Vargas MD;  Location: [facility];  Service: Pain Management;  Laterality: Right;     Social History     Social History Narrative    Not on file     S/he reports that she quit smoking about 9 years ago. Her smoking use included cigarettes. She has never used smokeless tobacco. She reports that she does not drink alcohol and does not use drugs.    Current Outpatient Medications on File Prior to  Visit   Medication Sig Dispense Refill    amoxicillin-clavulanate 875-125mg (AUGMENTIN) 875-125 mg per tablet Take 1 tablet by mouth every 12 (twelve) hours.      clindamycin-benzoyl peroxide (BENZACLIN) gel Apply topically 2 (two) times daily. 50 g 11    desloratadine (CLARINEX) 5 mg tablet Take 5 mg by mouth once daily.      eszopiclone (LUNESTA) 2 MG Tab       fluticasone propionate (FLONASE) 50 mcg/actuation nasal spray       gabapentin (NEURONTIN) 600 MG tablet       ipratropium (ATROVENT) 21 mcg (0.03 %) nasal spray       lamoTRIgine (LAMICTAL) 200 MG tablet       levalbuterol (XOPENEX HFA) 45 mcg/actuation inhaler Inhale 1-2 puffs into the lungs every 4 (four) hours as needed for Wheezing. Rescue      methylPREDNISolone (MEDROL) 4 MG Tab Take 4 mg by mouth once daily.      montelukast (SINGULAIR) 10 mg tablet Take 10 mg by mouth every evening.      omeprazole (PRILOSEC) 20 MG capsule       sertraline (ZOLOFT) 100 MG tablet       tiZANidine (ZANAFLEX) 4 MG tablet       triamcinolone acetonide 0.1% (KENALOG) 0.1 % cream       urea (CARMOL) 40 % Crea Apply topically once daily. 198 g 5     No current facility-administered medications on file prior to visit.     Physical Examination  Vitals:    03/13/24 0815   BP: 136/89   Pulse: 82   Temp: 98.1 °F (36.7 °C)     GENERAL:  female in no apparent distress and well developed and well nourished  HEAD:  Normocephalic, without obvious abnormality, atraumatic  EYES: sclera anicteric, conjunctiva normochromic  EARS: normal TM's and external ear canals both ears  NOSE: without erythema or discharge, clear discharge, turbinates normal    OROPHARYNX: Post nasal drip   LYMPH NODES: normal, supple, no lymphadenopathy  LUNGS: clear to auscultation, no wheezes, rales or rhonchi, symmetric air entry.  HEART: normal rate, regular rhythm, normal S1, S2, no murmurs, rubs, clicks or gallops.  ABDOMEN: soft, nontender, nondistended, no masses or organomegaly.  MUSCULOSKELETAL: no  gross joint deformity or swelling.  NEURO: alert, oriented, normal speech, no focal findings or movement disorder noted.  SKIN: normal coloration and turgor, no rashes, no suspicious skin lesions noted.     Spirometry  Date FEV1 (L)  (% predicted) FVC (L)  (% predicted) FEV1/ FVC XYS30-67%  (% predicted)   03/03/2024 92% 88% 84% 106%                 Interpretation: Normal spirometry. Non-obstructive.     Assessment/Plan:   Problem List Items Addressed This Visit          ENT    Chronic rhinitis - Primary    Current Assessment & Plan     - Not controlled at this time   - Ordered Flonase 1 SEN BID   - Ordered Astelin 1 SEN BID   - Educated on proper use of intranasal spray  - Ordered Serum IgE to Zone 6 Aeroallergens   - Will continue to monitor and reassess          Relevant Orders    Allergen Profile, Zone 6    HIV 1/2 Ag/Ab (4th Gen)    CBC Auto Differential (Completed)    Streptococcus Pneumoniae IgG Antibody (23 Serotypes), MAID    IgE    IgG    IgM    IgA    Recurrent sinus infections    Current Assessment & Plan     - Humoral evaluation at this time   - Administered PPSV23          Relevant Orders    Allergen Profile, Zone 6    HIV 1/2 Ag/Ab (4th Gen)    CBC Auto Differential (Completed)    Streptococcus Pneumoniae IgG Antibody (23 Serotypes), MAID    IgE    IgG    IgM    IgA    PND (post-nasal drip)    Relevant Orders    Allergen Profile, Zone 6    HIV 1/2 Ag/Ab (4th Gen)    CBC Auto Differential (Completed)    Streptococcus Pneumoniae IgG Antibody (23 Serotypes), MAID    IgE    IgG    IgM    IgA       Pulmonary    Shortness of breath     Follow up:  Follow up in about 6 weeks (around 4/24/2024).    Delmi Gonzalez MD   [facility]  [facility]

## 2024-03-15 LAB
IMMUNOLOGIST REVIEW: NORMAL
S PN DA SERO 19F IGG SER-MCNC: 1.8 MCG/ML
S PNEUM DA 1 IGG SER-MCNC: 0.1 MCG/ML
S PNEUM DA 10A IGG SER-MCNC: 0.2 MCG/ML
S PNEUM DA 11A IGG SER-MCNC: 0.2 MCG/ML
S PNEUM DA 12F IGG SER-MCNC: 0.1 MCG/ML
S PNEUM DA 14 IGG SER-MCNC: 0.8 MCG/ML
S PNEUM DA 15B IGG SER-MCNC: 0.3 MCG/ML
S PNEUM DA 17F IGG SER-MCNC: 0.3 MCG/ML
S PNEUM DA 18C IGG SER-MCNC: 0.1 MCG/ML
S PNEUM DA 19A IGG SER-MCNC: 0.2 MCG/ML
S PNEUM DA 2 IGG SER-MCNC: 0.1 MCG/ML
S PNEUM DA 20A IGG SER-MCNC: 0.6 MCG/ML
S PNEUM DA 22F IGG SER-MCNC: 0.2 MCG/ML
S PNEUM DA 23F IGG SER-MCNC: 0.4 MCG/ML
S PNEUM DA 3 IGG SER-MCNC: <0.1 MCG/ML
S PNEUM DA 33F IGG SER-MCNC: 0.5 MCG/ML
S PNEUM DA 4 IGG SER-MCNC: 0.2 MCG/ML
S PNEUM DA 5 IGG SER-MCNC: 0.1 MCG/ML
S PNEUM DA 6B IGG SER-MCNC: 0.2 MCG/ML
S PNEUM DA 7F IGG SER-MCNC: 0.6 MCG/ML
S PNEUM DA 8 IGG SER-MCNC: 0.3 MCG/ML
S PNEUM DA 9N IGG SER-MCNC: 0.1 MCG/ML
S PNEUM DA 9V IGG SER-MCNC: 0.1 MCG/ML

## 2024-03-19 LAB
A ALTERNATA IGE QN: <0.1 KU/L
A FUMIGATUS IGE QN: <0.1 KU/L
ALLERGEN BOXELDER MAPLE TREE IGE: <0.1 KU/L
ALLERGEN MAPLE (BOX ELDER) CLASS: ABNORMAL
ALLERGEN MULBERRY CLASS: ABNORMAL
ALLERGEN MULBERRY TREE IGE: <0.1 KU/L
ALLERGEN PIGWEED IGE: <0.1 KU/L
ALLERGEN WALNUT TREE IGE: <0.1 KU/L
BERMUDA GRASS IGE QN: <0.1 KU/L
C HERBARUM IGE QN: <0.1 KU/L
CAT DANDER IGE QN: <0.1 KU/L
COMMON PIGWEED CLASS: ABNORMAL
COMMON RAGWEED IGE QN: <0.1 KU/L
D FARINAE IGE QN: <0.1 KU/L
D PTERONYSS IGE QN: 0.12 KU/L
DEPRECATED A ALTERNATA IGE RAST QL: ABNORMAL
DEPRECATED A FUMIGATUS IGE RAST QL: ABNORMAL
DEPRECATED BERMUDA GRASS IGE RAST QL: ABNORMAL
DEPRECATED C HERBARUM IGE RAST QL: ABNORMAL
DEPRECATED CAT DANDER IGE RAST QL: ABNORMAL
DEPRECATED COMMON RAGWEED IGE RAST QL: ABNORMAL
DEPRECATED D FARINAE IGE RAST QL: ABNORMAL
DEPRECATED D PTERONYSS IGE RAST QL: ABNORMAL
DEPRECATED DOG DANDER IGE RAST QL: ABNORMAL
DEPRECATED ELDER IGE RAST QL: ABNORMAL
DEPRECATED MOUSE URINE PROT IGE RAST QL: ABNORMAL
DEPRECATED MT JUNIPER IGE RAST QL: ABNORMAL
DEPRECATED P NOTATUM IGE RAST QL: ABNORMAL
DEPRECATED PECAN/HICK TREE IGE RAST QL: ABNORMAL
DEPRECATED ROACH IGE RAST QL: ABNORMAL
DEPRECATED SILVER BIRCH IGE RAST QL: ABNORMAL
DEPRECATED TIMOTHY IGE RAST QL: ABNORMAL
DEPRECATED WHITE ELM IGE RAST QL: ABNORMAL
DEPRECATED WHITE OAK IGE RAST QL: ABNORMAL
DOG DANDER IGE QN: <0.1 KU/L
ELDER IGE QN: <0.1 KU/L
IGE: 5.6 IU/ML
MOUSE URINE PROT IGE QN: <0.1 KU/L
MT JUNIPER IGE QN: <0.1 KU/L
P NOTATUM IGE QN: <0.1 KU/L
PECAN/HICK TREE IGE QN: <0.1 KU/L
RAST ALLERGEN INTERPRETATION: ABNORMAL
ROACH IGE QN: <0.1 KU/L
SILVER BIRCH IGE QN: <0.1 KU/L
TIMOTHY IGE QN: <0.1 KU/L
WALNUT TREE CLASS: ABNORMAL
WHITE ELM IGE QN: <0.1 KU/L
WHITE OAK IGE QN: <0.1 KU/L

## 2024-04-29 ENCOUNTER — LAB VISIT (OUTPATIENT)
Dept: LAB | Facility: HOSPITAL | Age: 44
End: 2024-04-29
Attending: STUDENT IN AN ORGANIZED HEALTH CARE EDUCATION/TRAINING PROGRAM
Payer: COMMERCIAL

## 2024-04-29 ENCOUNTER — OFFICE VISIT (OUTPATIENT)
Dept: ALLERGY | Facility: CLINIC | Age: 44
End: 2024-04-29
Payer: COMMERCIAL

## 2024-04-29 VITALS
HEART RATE: 78 BPM | SYSTOLIC BLOOD PRESSURE: 132 MMHG | BODY MASS INDEX: 38.44 KG/M2 | WEIGHT: 244.94 LBS | HEIGHT: 67 IN | DIASTOLIC BLOOD PRESSURE: 80 MMHG | TEMPERATURE: 98 F

## 2024-04-29 DIAGNOSIS — J32.9 RECURRENT SINUS INFECTIONS: ICD-10-CM

## 2024-04-29 DIAGNOSIS — J30.89 ALLERGIC RHINITIS DUE TO DUST MITE: Primary | ICD-10-CM

## 2024-04-29 DIAGNOSIS — R09.82 PND (POST-NASAL DRIP): ICD-10-CM

## 2024-04-29 DIAGNOSIS — Z92.89 HISTORY OF IMMUNOTHERAPY: ICD-10-CM

## 2024-04-29 PROBLEM — R06.02 SHORTNESS OF BREATH: Status: RESOLVED | Noted: 2024-03-13 | Resolved: 2024-04-29

## 2024-04-29 PROBLEM — J31.0 CHRONIC RHINITIS: Status: RESOLVED | Noted: 2024-03-13 | Resolved: 2024-04-29

## 2024-04-29 PROCEDURE — 36415 COLL VENOUS BLD VENIPUNCTURE: CPT | Performed by: STUDENT IN AN ORGANIZED HEALTH CARE EDUCATION/TRAINING PROGRAM

## 2024-04-29 PROCEDURE — 95165 ANTIGEN THERAPY SERVICES: CPT | Mod: S$GLB,,, | Performed by: STUDENT IN AN ORGANIZED HEALTH CARE EDUCATION/TRAINING PROGRAM

## 2024-04-29 PROCEDURE — 99999 PR PBB SHADOW E&M-EST. PATIENT-LVL IV: CPT | Mod: PBBFAC,,, | Performed by: STUDENT IN AN ORGANIZED HEALTH CARE EDUCATION/TRAINING PROGRAM

## 2024-04-29 PROCEDURE — 86317 IMMUNOASSAY INFECTIOUS AGENT: CPT | Performed by: STUDENT IN AN ORGANIZED HEALTH CARE EDUCATION/TRAINING PROGRAM

## 2024-04-29 PROCEDURE — 3075F SYST BP GE 130 - 139MM HG: CPT | Mod: CPTII,S$GLB,, | Performed by: STUDENT IN AN ORGANIZED HEALTH CARE EDUCATION/TRAINING PROGRAM

## 2024-04-29 PROCEDURE — 99214 OFFICE O/P EST MOD 30 MIN: CPT | Mod: 25,S$GLB,, | Performed by: STUDENT IN AN ORGANIZED HEALTH CARE EDUCATION/TRAINING PROGRAM

## 2024-04-29 PROCEDURE — 3008F BODY MASS INDEX DOCD: CPT | Mod: CPTII,S$GLB,, | Performed by: STUDENT IN AN ORGANIZED HEALTH CARE EDUCATION/TRAINING PROGRAM

## 2024-04-29 PROCEDURE — 1159F MED LIST DOCD IN RCRD: CPT | Mod: CPTII,S$GLB,, | Performed by: STUDENT IN AN ORGANIZED HEALTH CARE EDUCATION/TRAINING PROGRAM

## 2024-04-29 PROCEDURE — 3079F DIAST BP 80-89 MM HG: CPT | Mod: CPTII,S$GLB,, | Performed by: STUDENT IN AN ORGANIZED HEALTH CARE EDUCATION/TRAINING PROGRAM

## 2024-04-29 PROCEDURE — 1160F RVW MEDS BY RX/DR IN RCRD: CPT | Mod: CPTII,S$GLB,, | Performed by: STUDENT IN AN ORGANIZED HEALTH CARE EDUCATION/TRAINING PROGRAM

## 2024-04-29 RX ORDER — EPINEPHRINE 0.3 MG/.3ML
1 INJECTION SUBCUTANEOUS ONCE
Qty: 0.3 ML | Refills: 0 | Status: SHIPPED | OUTPATIENT
Start: 2024-04-29 | End: 2024-04-29

## 2024-04-29 NOTE — PROGRESS NOTES
Allergy and Immunology  Allergy Shot Order Documentation    - Ordered allergy shots for patient   - 1 sets of allergy shots vials for build-up phase  - 30 shots per set x 1 sets - total of 30 shots    MD [facility]  Allergy and Clinical Immunology

## 2024-04-29 NOTE — PROGRESS NOTES
Allergy and Immunology  Established Patient Clinic Note    Date: 4/29/2024  Chief Complaint   Patient presents with    Follow-up     History  Selena Spring is a 43 y.o. female being seen for follow-up today.    Allergic Rhinitis due to dust mites   Post Nasal Drip   - Notable improvement since starting dual intranasal sprays   -  who works around a lot dust   - Wanting immunotherapy at this time     Recurrent Sinus Infections  - Patient denied infection since prior appointment   - Repeat titers at this time to assess for response to PPSV23     Allergies, PMH, PSH, Social, and Family History were reviewed.    Current Outpatient Medications on File Prior to Visit   Medication Sig Dispense Refill    azelastine (ASTELIN) 137 mcg (0.1 %) nasal spray 1 spray (137 mcg total) by Nasal route 2 (two) times daily. 30 mL 11    fluticasone propionate (FLONASE) 50 mcg/actuation nasal spray 1 spray (50 mcg total) by Each Nostril route 2 (two) times a day. 16 g 11    gabapentin (NEURONTIN) 600 MG tablet       lamoTRIgine (LAMICTAL) 200 MG tablet       levalbuterol (XOPENEX HFA) 45 mcg/actuation inhaler Inhale 1-2 puffs into the lungs every 4 (four) hours as needed for Wheezing. Rescue      montelukast (SINGULAIR) 10 mg tablet Take 10 mg by mouth every evening.      omeprazole (PRILOSEC) 20 MG capsule       sertraline (ZOLOFT) 100 MG tablet       tiZANidine (ZANAFLEX) 4 MG tablet       amoxicillin-clavulanate 875-125mg (AUGMENTIN) 875-125 mg per tablet Take 1 tablet by mouth every 12 (twelve) hours. (Patient not taking: Reported on 4/29/2024)      clindamycin-benzoyl peroxide (BENZACLIN) gel Apply topically 2 (two) times daily. (Patient not taking: Reported on 4/29/2024) 50 g 11    desloratadine (CLARINEX) 5 mg tablet Take 5 mg by mouth once daily. (Patient not taking: Reported on 4/29/2024)      eszopiclone (LUNESTA) 2 MG Tab  (Patient not taking: Reported on 4/29/2024)      fluticasone propionate (FLONASE) 50  mcg/actuation nasal spray       ipratropium (ATROVENT) 21 mcg (0.03 %) nasal spray  (Patient not taking: Reported on 4/29/2024)      methylPREDNISolone (MEDROL) 4 MG Tab Take 4 mg by mouth once daily. (Patient not taking: Reported on 4/29/2024)      triamcinolone acetonide 0.1% (KENALOG) 0.1 % cream  (Patient not taking: Reported on 4/29/2024)      urea (CARMOL) 40 % Crea Apply topically once daily. (Patient not taking: Reported on 4/29/2024) 198 g 5     No current facility-administered medications on file prior to visit.     Physical Examination  Vitals:    04/29/24 1109   BP: 132/80   Pulse: 78   Temp: 98.4 °F (36.9 °C)     GENERAL:  female in no apparent distress and well developed and well nourished  HEAD:  Normocephalic, without obvious abnormality, atraumatic  EYES: sclera anicteric, conjunctiva normochromic  EARS: normal TM's and external ear canals both ears  NOSE: without erythema or discharge, clear discharge, turbinates normal    OROPHARYNX: moist mucous membranes without erythema, exudates or petechiae, clear post-nasal drainage present  LYMPH NODES: normal, supple, no lymphadenopathy  LUNGS: clear to auscultation, no wheezes, rales or rhonchi, symmetric air entry.  HEART: normal rate, regular rhythm, normal S1, S2, no murmurs, rubs, clicks or gallops.  ABDOMEN: soft, nontender, nondistended, no masses or organomegaly.  MUSCULOSKELETAL: no gross joint deformity or swelling.  NEURO: alert, oriented, normal speech, no focal findings or movement disorder noted.  SKIN: normal coloration and turgor, no rashes, no suspicious skin lesions noted.     Assessment/Plan:   Problem List Items Addressed This Visit          ENT    Recurrent sinus infections    Overview     - 03/13/2024: Administered PPSV23          Current Assessment & Plan     - No infections since prior appointment   - Repeat titers at this time          Relevant Orders    Streptococcus Pneumoniae IgG Antibody (23 Serotypes), MAID    PND (post-nasal  drip)    Allergic rhinitis due to dust mite - Primary    Overview     - 03/13/2024: Serum IgE to Zone 6 Aeroallergens positive to dust mites   -  who works around a lot dust          Current Assessment & Plan     - Improved since prior appointment   - Continue Flonase 1 SEN BID   - Continue Astelin 1 SEN BID   - Educated on proper use of intranasal sprays   - Patient is an ideal candidate for AIT   - Obtained consent and demonstrated EpiPen use   - Ordered EpiPen PRN for AIT   - Will continue to monitor and reassess          Relevant Medications    Allergy Mix       Other    History of immunotherapy    Relevant Medications    EPINEPHrine (EPIPEN 2-ALEXANDRA) 0.3 mg/0.3 mL AtIn    Allergy Mix     Follow up:  Follow up in about 6 months (around 10/29/2024).    Visit today included increased complexity associated with the care of the episodic problem PND addressed and managing the longitudinal care of the patient due to the serious and/or complex managed problem(s) Allergic rhinitis due to dust mites and immunotherapy.    MD Sariah [facility]  Allergy and Immunology

## 2024-04-29 NOTE — ASSESSMENT & PLAN NOTE
- Improved since prior appointment   - Continue Flonase 1 SEN BID   - Continue Astelin 1 SEN BID   - Educated on proper use of intranasal sprays   - Patient is an ideal candidate for AIT   - Obtained consent and demonstrated EpiPen use   - Ordered EpiPen PRN for AIT   - Will continue to monitor and reassess

## 2024-05-03 LAB
IMMUNOLOGIST REVIEW: NORMAL
S PN DA SERO 19F IGG SER-MCNC: 1.4 MCG/ML
S PNEUM DA 1 IGG SER-MCNC: 0.2 MCG/ML
S PNEUM DA 10A IGG SER-MCNC: 0.3 MCG/ML
S PNEUM DA 11A IGG SER-MCNC: 0.3 MCG/ML
S PNEUM DA 12F IGG SER-MCNC: 0.1 MCG/ML
S PNEUM DA 14 IGG SER-MCNC: 1 MCG/ML
S PNEUM DA 15B IGG SER-MCNC: 0.4 MCG/ML
S PNEUM DA 17F IGG SER-MCNC: 1.1 MCG/ML
S PNEUM DA 18C IGG SER-MCNC: 0.1 MCG/ML
S PNEUM DA 19A IGG SER-MCNC: 0.3 MCG/ML
S PNEUM DA 2 IGG SER-MCNC: 0.2 MCG/ML
S PNEUM DA 20A IGG SER-MCNC: 0.7 MCG/ML
S PNEUM DA 22F IGG SER-MCNC: 0.2 MCG/ML
S PNEUM DA 23F IGG SER-MCNC: 0.6 MCG/ML
S PNEUM DA 3 IGG SER-MCNC: 0.1 MCG/ML
S PNEUM DA 33F IGG SER-MCNC: 0.7 MCG/ML
S PNEUM DA 4 IGG SER-MCNC: 0.2 MCG/ML
S PNEUM DA 5 IGG SER-MCNC: 0.1 MCG/ML
S PNEUM DA 6B IGG SER-MCNC: 0.2 MCG/ML
S PNEUM DA 7F IGG SER-MCNC: 0.7 MCG/ML
S PNEUM DA 8 IGG SER-MCNC: 1 MCG/ML
S PNEUM DA 9N IGG SER-MCNC: 0.1 MCG/ML
S PNEUM DA 9V IGG SER-MCNC: 0.1 MCG/ML

## 2024-05-10 ENCOUNTER — PATIENT MESSAGE (OUTPATIENT)
Dept: ALLERGY | Facility: CLINIC | Age: 44
End: 2024-05-10
Payer: COMMERCIAL

## 2024-05-20 ENCOUNTER — PROCEDURE VISIT (OUTPATIENT)
Dept: ALLERGY | Facility: CLINIC | Age: 44
End: 2024-05-20
Payer: COMMERCIAL

## 2024-05-20 DIAGNOSIS — Z92.89 HISTORY OF IMMUNOTHERAPY: ICD-10-CM

## 2024-05-20 DIAGNOSIS — J30.89 ALLERGIC RHINITIS DUE TO DUST MITE: Primary | ICD-10-CM

## 2024-05-20 PROCEDURE — 95165 ANTIGEN THERAPY SERVICES: CPT | Mod: S$GLB,,, | Performed by: STUDENT IN AN ORGANIZED HEALTH CARE EDUCATION/TRAINING PROGRAM

## 2024-05-21 ENCOUNTER — OFFICE VISIT (OUTPATIENT)
Dept: OBSTETRICS AND GYNECOLOGY | Facility: CLINIC | Age: 44
End: 2024-05-21
Payer: COMMERCIAL

## 2024-05-21 VITALS
SYSTOLIC BLOOD PRESSURE: 140 MMHG | HEIGHT: 67 IN | DIASTOLIC BLOOD PRESSURE: 80 MMHG | WEIGHT: 241.94 LBS | BODY MASS INDEX: 37.97 KG/M2

## 2024-05-21 DIAGNOSIS — Z01.419 ENCOUNTER FOR ANNUAL ROUTINE GYNECOLOGICAL EXAMINATION: Primary | ICD-10-CM

## 2024-05-21 DIAGNOSIS — Z80.3 FAMILY HISTORY OF BREAST CANCER IN MOTHER: ICD-10-CM

## 2024-05-21 PROCEDURE — 1159F MED LIST DOCD IN RCRD: CPT | Mod: CPTII,S$GLB,,

## 2024-05-21 PROCEDURE — 3077F SYST BP >= 140 MM HG: CPT | Mod: CPTII,S$GLB,,

## 2024-05-21 PROCEDURE — 99396 PREV VISIT EST AGE 40-64: CPT | Mod: S$GLB,,,

## 2024-05-21 PROCEDURE — 3079F DIAST BP 80-89 MM HG: CPT | Mod: CPTII,S$GLB,,

## 2024-05-21 PROCEDURE — 3008F BODY MASS INDEX DOCD: CPT | Mod: CPTII,S$GLB,,

## 2024-05-21 PROCEDURE — 99999 PR PBB SHADOW E&M-EST. PATIENT-LVL III: CPT | Mod: PBBFAC,,,

## 2024-05-21 PROCEDURE — 1160F RVW MEDS BY RX/DR IN RCRD: CPT | Mod: CPTII,S$GLB,,

## 2024-05-21 RX ORDER — PRAZOSIN HYDROCHLORIDE 1 MG/1
2 CAPSULE ORAL NIGHTLY
COMMUNITY

## 2024-05-21 NOTE — PROGRESS NOTES
Subjective:       Patient ID: Selena Spring is a 43 y.o. female.    Chief Complaint:  Well Woman and Cyst      History of Present Illness  HPI  Annual Exam-Premenopausal  Patient presents for annual exam. The patient has no complaints today. The patient is not currently sexually active. GYN screening history: last pap: approximate date 8/9/22 and was normal and last mammogram: approximate date 12/2023 and was normal. Mammogram at [facility]. The patient wears seatbelts: yes. The patient participates in regular exercise: yes, going to [employer] 3-5x a week Has the patient ever been transfused or tattooed?: yes, tattoos. The patient reports that there is not domestic violence in her life. Menses are regular with periods lasting 3-5 days.  Denies excessive bleeding or cramping.      Mom with breast and lung cancer, recently passed away from complications after surgery, patient coping well.   GYN & OB History  Patient's last menstrual period was 04/30/2024.   Date of Last Pap: No result found    OB History   No obstetric history on file.       Review of Systems  Review of Systems   Constitutional: Negative.    HENT: Negative.     Eyes: Negative.    Respiratory: Negative.     Cardiovascular: Negative.    Gastrointestinal: Negative.    Genitourinary: Negative.    Musculoskeletal: Negative.    Integumentary:  Negative.   Neurological: Negative.    Hematological: Negative.    Psychiatric/Behavioral: Negative.     All other systems reviewed and are negative.  Breast: Positive for breast self exam.          Objective:      Physical Exam:   Constitutional: She is oriented to person, place, and time. She appears well-developed and well-nourished.    HENT:   Head: Normocephalic and atraumatic.   Nose: Nose normal.    Eyes: Pupils are equal, round, and reactive to light. Conjunctivae and EOM are normal.     Cardiovascular:  Normal rate and regular rhythm.             Pulmonary/Chest: Effort normal. She has no decreased  breath sounds. She has no rhonchi. Right breast exhibits no inverted nipple, no mass, no nipple discharge, no tenderness and no bleeding. Left breast exhibits no inverted nipple, no mass, no nipple discharge, no tenderness and no bleeding. Breasts are symmetrical.        Abdominal: Soft. There is no abdominal tenderness.     Genitourinary:    Inguinal canal, vagina, uterus, right adnexa and left adnexa normal.      Pelvic exam was performed with patient supine.   The external female genitalia was normal.   No external genitalia lesions identified,Genitalia hair distrobution normal .     Labial bartholins normal.Cervix is normal. Right adnexum displays no mass and no tenderness. Left adnexum displays no mass and no tenderness. No vaginal discharge, tenderness or bleeding in the vagina. Vagina was moist.Cervix exhibits no motion tenderness, no discharge and no tenderness.    pap smear not completedUerus contour normal  Uterus is not tender. Normal urethral meatus.          Musculoskeletal: Normal range of motion and moves all extremeties.       Neurological: She is alert and oriented to person, place, and time.    Skin: Skin is warm and dry.    Psychiatric: She has a normal mood and affect. Her speech is normal and behavior is normal. Judgment and thought content normal. She has a tearful affect (when asked about mom).             Assessment:        1. Encounter for annual routine gynecological examination    2. Family history of breast cancer in mother               Plan:   Continue annual well woman exam.  Pap current, due 8/2025. Patient was counseled today on ASCCP screening guidelines (pap smear every 3 years in low risk patients) and recommendations for annual pelvic exams and clinical breast exams, yearly mammograms until age 75; and to see her PCP for other healthcare maintenance.    Continue diet, exercise, weight loss      Diagnosis and orders this visit:  Encounter for annual routine gynecological  examination    Family history of breast cancer in mother      Cynthia Fregoso, NP

## 2024-05-28 ENCOUNTER — PATIENT MESSAGE (OUTPATIENT)
Dept: ALLERGY | Facility: CLINIC | Age: 44
End: 2024-05-28
Payer: COMMERCIAL

## 2024-06-07 NOTE — PROCEDURES
Allergy and Immunology  Allergy Shot Order Documentation    - Ordered allergy shots for patient   - 1 sets of allergy shots vials for build-up phase  - 30 shots per set x 1 sets - total of 30 shots  - See media tab for mixing date verification     Kesler Bourgoyne, MD [facility]  Allergy and Clinical Immunology

## 2024-06-11 ENCOUNTER — CLINICAL SUPPORT (OUTPATIENT)
Dept: ALLERGY | Facility: CLINIC | Age: 44
End: 2024-06-11
Payer: COMMERCIAL

## 2024-06-11 VITALS — SYSTOLIC BLOOD PRESSURE: 139 MMHG | DIASTOLIC BLOOD PRESSURE: 89 MMHG | HEART RATE: 80 BPM

## 2024-06-11 DIAGNOSIS — J30.89 ALLERGIC RHINITIS DUE TO DUST MITE: Primary | ICD-10-CM

## 2024-06-11 DIAGNOSIS — J45.909: ICD-10-CM

## 2024-06-11 PROCEDURE — 99999 PR PBB SHADOW E&M-EST. PATIENT-LVL III: CPT | Mod: PBBFAC,,,

## 2024-06-11 PROCEDURE — 95115 IMMUNOTHERAPY ONE INJECTION: CPT | Mod: S$GLB,,, | Performed by: STUDENT IN AN ORGANIZED HEALTH CARE EDUCATION/TRAINING PROGRAM

## 2024-06-11 NOTE — PROGRESS NOTES
See Flowsheet for immunotherapy administration. Patient waited in clinic 30 min for observation. Pt had epi pen on hand. Training done on Epi-pen.

## 2024-06-21 ENCOUNTER — CLINICAL SUPPORT (OUTPATIENT)
Dept: ALLERGY | Facility: CLINIC | Age: 44
End: 2024-06-21
Payer: COMMERCIAL

## 2024-06-21 DIAGNOSIS — J30.89 ALLERGIC RHINITIS DUE TO DUST MITE: Primary | ICD-10-CM

## 2024-06-21 DIAGNOSIS — J45.909: ICD-10-CM

## 2024-06-21 PROCEDURE — 99999 PR PBB SHADOW E&M-EST. PATIENT-LVL I: CPT | Mod: PBBFAC,,,

## 2024-06-25 ENCOUNTER — CLINICAL SUPPORT (OUTPATIENT)
Dept: ALLERGY | Facility: CLINIC | Age: 44
End: 2024-06-25
Payer: COMMERCIAL

## 2024-06-25 DIAGNOSIS — J30.89 ALLERGIC RHINITIS DUE TO DUST MITE: Primary | ICD-10-CM

## 2024-06-28 ENCOUNTER — PATIENT MESSAGE (OUTPATIENT)
Dept: ALLERGY | Facility: CLINIC | Age: 44
End: 2024-06-28
Payer: COMMERCIAL

## 2024-07-03 ENCOUNTER — CLINICAL SUPPORT (OUTPATIENT)
Dept: ALLERGY | Facility: CLINIC | Age: 44
End: 2024-07-03
Payer: COMMERCIAL

## 2024-07-03 DIAGNOSIS — J30.89 ALLERGIC RHINITIS DUE TO DUST MITE: Primary | ICD-10-CM

## 2024-07-03 DIAGNOSIS — J45.909: ICD-10-CM

## 2024-07-03 PROCEDURE — 95115 IMMUNOTHERAPY ONE INJECTION: CPT | Mod: S$GLB,,, | Performed by: STUDENT IN AN ORGANIZED HEALTH CARE EDUCATION/TRAINING PROGRAM

## 2024-07-03 PROCEDURE — 99999 PR PBB SHADOW E&M-EST. PATIENT-LVL III: CPT | Mod: PBBFAC,,,

## 2024-07-10 ENCOUNTER — CLINICAL SUPPORT (OUTPATIENT)
Dept: ALLERGY | Facility: CLINIC | Age: 44
End: 2024-07-10
Payer: COMMERCIAL

## 2024-07-10 DIAGNOSIS — J30.89 ALLERGIC RHINITIS DUE TO DUST MITE: Primary | ICD-10-CM

## 2024-07-10 DIAGNOSIS — J45.909: ICD-10-CM

## 2024-07-10 PROCEDURE — 99999 PR PBB SHADOW E&M-EST. PATIENT-LVL III: CPT | Mod: PBBFAC,,,

## 2024-07-10 PROCEDURE — 95115 IMMUNOTHERAPY ONE INJECTION: CPT | Mod: S$GLB,,, | Performed by: STUDENT IN AN ORGANIZED HEALTH CARE EDUCATION/TRAINING PROGRAM

## 2024-07-12 ENCOUNTER — CLINICAL SUPPORT (OUTPATIENT)
Dept: ALLERGY | Facility: CLINIC | Age: 44
End: 2024-07-12
Payer: COMMERCIAL

## 2024-07-12 DIAGNOSIS — J30.89 ALLERGIC RHINITIS DUE TO DUST MITE: Primary | ICD-10-CM

## 2024-07-12 PROCEDURE — 99999 PR PBB SHADOW E&M-EST. PATIENT-LVL III: CPT | Mod: PBBFAC,,,

## 2024-07-16 ENCOUNTER — PATIENT MESSAGE (OUTPATIENT)
Dept: ALLERGY | Facility: CLINIC | Age: 44
End: 2024-07-16

## 2024-07-16 ENCOUNTER — CLINICAL SUPPORT (OUTPATIENT)
Dept: ALLERGY | Facility: CLINIC | Age: 44
End: 2024-07-16
Payer: COMMERCIAL

## 2024-07-16 ENCOUNTER — TELEPHONE (OUTPATIENT)
Dept: ALLERGY | Facility: CLINIC | Age: 44
End: 2024-07-16
Payer: COMMERCIAL

## 2024-07-16 DIAGNOSIS — J30.89 ALLERGIC RHINITIS DUE TO DUST MITE: Primary | ICD-10-CM

## 2024-07-16 PROCEDURE — 95115 IMMUNOTHERAPY ONE INJECTION: CPT | Mod: S$GLB,,, | Performed by: STUDENT IN AN ORGANIZED HEALTH CARE EDUCATION/TRAINING PROGRAM

## 2024-07-16 NOTE — TELEPHONE ENCOUNTER
Message left for patient that she may come for allergy shot today at 1030 a.m.    ----- Message from Ritu Pryor MA sent at 7/16/2024  9:11 AM CDT -----  Contact: Selena    ----- Message -----  From: Marcus Martins  Sent: 7/16/2024   9:02 AM CDT  To: On Allergy/Immunology Clinical Support    Selena is requesting a call back to see if she can still come get an allergy shot at 10:30 today. Please give her a call at [phone]

## 2024-07-19 ENCOUNTER — CLINICAL SUPPORT (OUTPATIENT)
Dept: ALLERGY | Facility: CLINIC | Age: 44
End: 2024-07-19
Payer: COMMERCIAL

## 2024-07-19 DIAGNOSIS — J30.89 ALLERGIC RHINITIS DUE TO DUST MITE: Primary | ICD-10-CM

## 2024-07-19 PROCEDURE — 95115 IMMUNOTHERAPY ONE INJECTION: CPT | Mod: S$GLB,,, | Performed by: STUDENT IN AN ORGANIZED HEALTH CARE EDUCATION/TRAINING PROGRAM

## 2024-07-19 PROCEDURE — 99999 PR PBB SHADOW E&M-EST. PATIENT-LVL III: CPT | Mod: PBBFAC,,,

## 2024-07-24 ENCOUNTER — CLINICAL SUPPORT (OUTPATIENT)
Dept: ALLERGY | Facility: CLINIC | Age: 44
End: 2024-07-24
Payer: COMMERCIAL

## 2024-07-24 DIAGNOSIS — J30.89 ALLERGIC RHINITIS DUE TO DUST MITE: Primary | ICD-10-CM

## 2024-07-24 PROCEDURE — 99999 PR PBB SHADOW E&M-EST. PATIENT-LVL III: CPT | Mod: PBBFAC,,,

## 2024-07-24 PROCEDURE — 95115 IMMUNOTHERAPY ONE INJECTION: CPT | Mod: S$GLB,,, | Performed by: STUDENT IN AN ORGANIZED HEALTH CARE EDUCATION/TRAINING PROGRAM

## 2024-07-30 ENCOUNTER — CLINICAL SUPPORT (OUTPATIENT)
Dept: ALLERGY | Facility: CLINIC | Age: 44
End: 2024-07-30
Payer: COMMERCIAL

## 2024-07-30 DIAGNOSIS — J45.909: ICD-10-CM

## 2024-07-30 DIAGNOSIS — J30.89 ALLERGIC RHINITIS DUE TO DUST MITE: Primary | ICD-10-CM

## 2024-08-09 ENCOUNTER — CLINICAL SUPPORT (OUTPATIENT)
Dept: ALLERGY | Facility: CLINIC | Age: 44
End: 2024-08-09
Payer: COMMERCIAL

## 2024-08-09 DIAGNOSIS — J30.89 ALLERGIC RHINITIS DUE TO DERMATOPHAGOIDES FARINAE: ICD-10-CM

## 2024-08-09 DIAGNOSIS — J45.909: ICD-10-CM

## 2024-08-09 DIAGNOSIS — J30.89 ALLERGIC RHINITIS DUE TO DUST MITE: Primary | ICD-10-CM

## 2024-08-09 PROCEDURE — 99999 PR PBB SHADOW E&M-EST. PATIENT-LVL III: CPT | Mod: PBBFAC,,,

## 2024-08-12 ENCOUNTER — CLINICAL SUPPORT (OUTPATIENT)
Dept: ALLERGY | Facility: CLINIC | Age: 44
End: 2024-08-12
Payer: COMMERCIAL

## 2024-08-12 DIAGNOSIS — J30.89 ALLERGIC RHINITIS DUE TO DUST MITE: Primary | ICD-10-CM

## 2024-08-12 DIAGNOSIS — J30.89 ALLERGIC RHINITIS DUE TO DERMATOPHAGOIDES FARINAE: ICD-10-CM

## 2024-08-12 DIAGNOSIS — J45.909: ICD-10-CM

## 2024-08-12 PROCEDURE — 99999 PR PBB SHADOW E&M-EST. PATIENT-LVL III: CPT | Mod: PBBFAC,,,

## 2024-08-12 PROCEDURE — 95115 IMMUNOTHERAPY ONE INJECTION: CPT | Mod: S$GLB,,, | Performed by: STUDENT IN AN ORGANIZED HEALTH CARE EDUCATION/TRAINING PROGRAM

## 2024-08-20 ENCOUNTER — PATIENT MESSAGE (OUTPATIENT)
Dept: ORTHOPEDICS | Facility: CLINIC | Age: 44
End: 2024-08-20
Payer: COMMERCIAL

## 2024-08-20 ENCOUNTER — CLINICAL SUPPORT (OUTPATIENT)
Dept: ALLERGY | Facility: CLINIC | Age: 44
End: 2024-08-20
Payer: COMMERCIAL

## 2024-08-20 DIAGNOSIS — J30.89 ALLERGIC RHINITIS DUE TO DERMATOPHAGOIDES FARINAE: ICD-10-CM

## 2024-08-20 DIAGNOSIS — J45.909: ICD-10-CM

## 2024-08-20 DIAGNOSIS — J30.89 ALLERGIC RHINITIS DUE TO DUST MITE: Primary | ICD-10-CM

## 2024-08-20 PROCEDURE — 95115 IMMUNOTHERAPY ONE INJECTION: CPT | Mod: S$GLB,,, | Performed by: STUDENT IN AN ORGANIZED HEALTH CARE EDUCATION/TRAINING PROGRAM

## 2024-08-20 PROCEDURE — 99999 PR PBB SHADOW E&M-EST. PATIENT-LVL III: CPT | Mod: PBBFAC,,,

## 2024-08-27 ENCOUNTER — PATIENT MESSAGE (OUTPATIENT)
Dept: ALLERGY | Facility: CLINIC | Age: 44
End: 2024-08-27
Payer: COMMERCIAL

## 2024-08-27 DIAGNOSIS — M25.511 RIGHT SHOULDER PAIN, UNSPECIFIED CHRONICITY: Primary | ICD-10-CM

## 2024-08-29 ENCOUNTER — OFFICE VISIT (OUTPATIENT)
Dept: SPORTS MEDICINE | Facility: CLINIC | Age: 44
End: 2024-08-29
Payer: COMMERCIAL

## 2024-08-29 ENCOUNTER — HOSPITAL ENCOUNTER (OUTPATIENT)
Dept: RADIOLOGY | Facility: HOSPITAL | Age: 44
Discharge: HOME | End: 2024-08-29
Attending: ORTHOPAEDIC SURGERY
Payer: COMMERCIAL

## 2024-08-29 VITALS — BODY MASS INDEX: 36.88 KG/M2 | WEIGHT: 235 LBS | HEIGHT: 67 IN

## 2024-08-29 DIAGNOSIS — M25.511 RIGHT SHOULDER PAIN, UNSPECIFIED CHRONICITY: ICD-10-CM

## 2024-08-29 DIAGNOSIS — M75.41 ROTATOR CUFF IMPINGEMENT SYNDROME OF RIGHT SHOULDER: Primary | ICD-10-CM

## 2024-08-29 DIAGNOSIS — E66.01 CLASS 2 SEVERE OBESITY WITH SERIOUS COMORBIDITY AND BODY MASS INDEX (BMI) OF 36.0 TO 36.9 IN ADULT, UNSPECIFIED OBESITY TYPE: ICD-10-CM

## 2024-08-29 DIAGNOSIS — M19.011 PRIMARY OSTEOARTHRITIS OF RIGHT SHOULDER: ICD-10-CM

## 2024-08-29 PROCEDURE — 20610 DRAIN/INJ JOINT/BURSA W/O US: CPT | Mod: RT,S$GLB,, | Performed by: ORTHOPAEDIC SURGERY

## 2024-08-29 PROCEDURE — 1159F MED LIST DOCD IN RCRD: CPT | Mod: CPTII,S$GLB,, | Performed by: ORTHOPAEDIC SURGERY

## 2024-08-29 PROCEDURE — 3008F BODY MASS INDEX DOCD: CPT | Mod: CPTII,S$GLB,, | Performed by: ORTHOPAEDIC SURGERY

## 2024-08-29 PROCEDURE — 99999 PR PBB SHADOW E&M-EST. PATIENT-LVL IV: CPT | Mod: PBBFAC,,, | Performed by: ORTHOPAEDIC SURGERY

## 2024-08-29 PROCEDURE — 73030 X-RAY EXAM OF SHOULDER: CPT | Mod: 26,RT,, | Performed by: RADIOLOGY

## 2024-08-29 PROCEDURE — 99214 OFFICE O/P EST MOD 30 MIN: CPT | Mod: 25,S$GLB,, | Performed by: ORTHOPAEDIC SURGERY

## 2024-08-29 PROCEDURE — 73030 X-RAY EXAM OF SHOULDER: CPT | Mod: TC,PN,RT

## 2024-08-29 RX ADMIN — METHYLPREDNISOLONE ACETATE 80 MG: 80 INJECTION, SUSPENSION INTRA-ARTICULAR; INTRALESIONAL; INTRAMUSCULAR; SOFT TISSUE at 03:08

## 2024-08-29 NOTE — PATIENT INSTRUCTIONS
Assessment:  Selena Spring is a RHD 43 y.o. female    with a chief complaint of Pain of the Right Shoulder    Right shoulder subacromial impingement   History of previous impingement surgery   Early shoulder and AC arthritis    Encounter Diagnoses   Name Primary?    Rotator cuff impingement syndrome of right shoulder Yes    Right shoulder pain, unspecified chronicity     Primary osteoarthritis of right shoulder     Class 2 severe obesity with serious comorbidity and body mass index (BMI) of 36.0 to 36.9 in adult, unspecified obesity type       Plan:  Order PT at [facility] - 2-3x per week for 6-8 weeks  Right shoulder subacromial CSI 2/2/80  Recommend checking with allergist before proceeding with allergy shots.      Although there is not a cure for arthritis, there are effective ways to improve symptoms.     Exercise & Activity:  I recommend low impact activities.  Walking is great for arthritis: https://www.GlobeSherpa.Apica/3-reasons-walking-with-knee-arthritis/  If walking long distances, I recommend good quality well-cushioned shoes. Varsity sports can help you find the right ones: https://www.[employer]er.Apica/  Aquatic and pool therapy is often helpful because it lessens the impact on the joint, strengthens the muscles, and helps to control swelling.   Joint motion is important to the health of the joint and cartilage.       Prescriptions & Medications:  I do recommend formal physical therapy or at minimum a home exercise program.   Over the counter analgesic (pain-relieving) medications can help. Examples are Tylenol, Ibuprofen, and Aleve. Check with your primary care physician to make sure you don't have contra-indications to taking those medicines.  Some over the counter supplement solutions such as glucosamine and chondroitin may help with symptoms, although the evidence is mixed.    Healthy Lifestyle:  Excess body weight can have a negative impact on joint health and on pain. I recommend  healthy lifestyle choices including nutrition and exercise that help reach and maintain an ideal body weight. Tips for Exercise: https://www.Mobiotics."[employer]/13-exercise-tips-for-a-healthier-you/  Some diets cause increased inflammation. I recommend a balanced wholesome diet including some foods such as olives that are shown to decrease inflammation. More diet information available here: https://www.Mobiotics."[employer]/8-best-foods-for-knee-arthritis/      Follow-up: 6 weeks or sooner if there are any problems between now and then.    Leave Review:   Google: Leave Google Review  Healthgrades: Leave Healthgrades Review    After Hours Number: [phone]

## 2024-08-30 NOTE — PROGRESS NOTES
Patient ID: Selena Spring  YOB: 1980  MRN: [id number]    Chief Complaint: Pain of the Right Shoulder    Referred By: Bandar Vargas NP    History of Present Illness: Selena Spring is a RHD 43 y.o. female    with a chief complaint of Pain of the Right Shoulder    Selena presents today with right shoulder pain. She reports a 3-5/10 pain today, but when worsed is a 8-9/10. She reports it increases with overhead movement or when waking up in the morning. She reports chornic shoulder pain and denies any injury. She does report a preivous surgery in 2017 for bone spurs in this shoulder out in [place]. She reports the pain will wake her up at night. She has not had any recent injections or physical therapy. She does take advil as needed for the pain.     Pain      Past Medical History:   History reviewed. No pertinent past medical history.  Past Surgical History:   Procedure Laterality Date    INJECTION OF JOINT Right 3/10/2021    Procedure: Right IA Hip Joint injection - per orthopedics [facility];  Surgeon: Renny Vargas MD;  Location[place] [facility] [place];  Service: Pain Management;  Laterality: Right;     Family History   Problem Relation Name Age of Onset    Breast cancer Mother      Lung cancer Mother      Stroke Mother      Breast cancer Paternal Aunt       Social History     Socioeconomic History    Marital status: Single   Tobacco Use    Smoking status: Former     Current packs/day: 0.00     Types: Cigarettes     Quit date:      Years since quittin.6    Smokeless tobacco: Never   Substance and Sexual Activity    Alcohol use: Never    Drug use: Never     Social Determinants of Health     Financial Resource Strain: Low Risk  (2024)    Overall Financial Resource Strain (CARDIA)     Difficulty of Paying Living Expenses: Not hard at all   Food Insecurity: No Food Insecurity (2024)    Hunger Vital Sign     Worried About Running Out of Food in the Last Year: Never true      Ran Out of Food in the Last Year: Never true   Physical Activity: Insufficiently Active (8/29/2024)    Exercise Vital Sign     Days of Exercise per Week: 4 days     Minutes of Exercise per Session: 30 min   Stress: Stress Concern Present (8/29/2024)    [place] [employer] - Occupational Stress Questionnaire     Feeling of Stress : Rather much   Housing Stability: Unknown (8/29/2024)    Housing Stability Vital Sign     Unable to Pay for Housing in the Last Year: No     Medication List with Changes/Refills   Current Medications    AZELASTINE (ASTELIN) 137 MCG (0.1 %) NASAL SPRAY    1 spray (137 mcg total) by Nasal route 2 (two) times daily.    CLINDAMYCIN-BENZOYL PEROXIDE (BENZACLIN) GEL    Apply topically 2 (two) times daily.    EPINEPHRINE (EPIPEN 2-ALEXANDRA) 0.3 MG/0.3 ML ATIN    Inject 0.3 mLs (0.3 mg total) into the muscle once. for 1 dose    FLUTICASONE (VERAMYST) 27.5 MCG/ACTUATION NASAL SPRAY    2 sprays by Nasal route.    FLUTICASONE PROPIONATE (FLONASE) 50 MCG/ACTUATION NASAL SPRAY    1 spray (50 mcg total) by Each Nostril route 2 (two) times a day.    GABAPENTIN (NEURONTIN) 600 MG TABLET        LAMOTRIGINE (LAMICTAL) 200 MG TABLET        OMEPRAZOLE (PRILOSEC) 20 MG CAPSULE        PRAZOSIN (MINIPRESS) 1 MG CAP    Take 2 mg by mouth every evening.    SERTRALINE (ZOLOFT) 100 MG TABLET        TIZANIDINE (ZANAFLEX) 4 MG TABLET        TRIAMCINOLONE ACETONIDE 0.1% (KENALOG) 0.1 % CREAM        UREA (CARMOL) 40 % CREA    Apply topically once daily.   Discontinued Medications    DESLORATADINE (CLARINEX) 5 MG TABLET    Take 5 mg by mouth once daily.    LEVALBUTEROL (XOPENEX HFA) 45 MCG/ACTUATION INHALER    Inhale 1-2 puffs into the lungs every 4 (four) hours as needed for Wheezing. Rescue    MONTELUKAST (SINGULAIR) 10 MG TABLET    Take 10 mg by mouth every evening.     Review of patient's allergies indicates:   Allergen Reactions    Sulfa (sulfonamide antibiotics) Hives     ROS    Physical Exam:    Body mass index is 36.81 kg/m².  There were no vitals filed for this visit.   GENERAL: Well appearing, appropriate for stated age, no acute distress.  CARDIOVASCULAR: Pulses regular by peripheral palpation.  PULMONARY: Respirations are even and non-labored.  NEURO: Awake, alert, and oriented x 3.  PSYCH: Mood & affect are appropriate.  HEENT: Head is normocephalic and atraumatic.          Right Shoulder Exam     Tenderness   The patient is tender to palpation of the acromion and acromioclavicular joint.    Range of Motion   Active abduction:  170   Forward Flexion:  180   Internal rotation 0 degrees:  Mid thoracic     Tests & Signs   Medina test: positive  Active Compression Test ([place]'s Sign): negative  Speed's Test: negative    Comments:  Intact extensor pollicis longus, flexor pollicis longus, finger flexion, finger extension, finger abduction and adduction. Sensation intact to radial, median, ulnar, and axillary nerve distributions. Hand warm and well perfused with capillary refill of less than 2 seconds, and palpable distal radial pulses.      Muscle Strength   Right Upper Extremity   Shoulder Abduction: 5/5   Shoulder Internal Rotation: 5/5   Shoulder External Rotation: 5/5       Imaging:    X-ray Shoulder 2 or More Views Right  Narrative: EXAM: XR SHOULDER COMPLETE 2 OR MORE VIEWS RIGHT    CLINICAL HISTORY: Right shoulder pain.    COMPARISON: None    TECHNIQUE: 4 views of the right shoulder    FINDINGS:  No acute fracture.  Joint alignment is anatomic.  Bony humeral joint space is maintained.  No periarticular calcification.  The acromion is small or absent with possible widening of the AC joint.  No significant clavicular elevation.  Partially visualized right hemithorax is within normal limits.  Impression:   As above.    Finalized on: 8/29/2024 2:51 PM By:  Bryn Chaves MD  [facility]# [id number]      2024-08-29 14:53:[id number]    [facility]    Relevant imaging results reviewed and interpreted by me, discussed  with the patient and / or family today.     Other Tests:       Patient Instructions   Assessment:  Selena Spring is a RHD 43 y.o. female    with a chief complaint of Pain of the Right Shoulder    Right shoulder subacromial impingement   History of previous impingement surgery   Early shoulder and AC arthritis    Encounter Diagnoses   Name Primary?    Rotator cuff impingement syndrome of right shoulder Yes    Right shoulder pain, unspecified chronicity     Primary osteoarthritis of right shoulder     Class 2 severe obesity with serious comorbidity and body mass index (BMI) of 36.0 to 36.9 in adult, unspecified obesity type       Plan:  Order PT at [facility] - 2-3x per week for 6-8 weeks  Right shoulder subacromial CSI 2/2/80  Recommend checking with allergist before proceeding with allergy shots.      Although there is not a cure for arthritis, there are effective ways to improve symptoms.     Exercise & Activity:  I recommend low impact activities.  Walking is great for arthritis: https://www.iConText.Wevebob/3-reasons-walking-with-knee-arthritis/  If walking long distances, I recommend good quality well-cushioned shoes. Varsity sports can help you find the right ones: https://www.Bablic.Wevebob/  Aquatic and pool therapy is often helpful because it lessens the impact on the joint, strengthens the muscles, and helps to control swelling.   Joint motion is important to the health of the joint and cartilage.       Prescriptions & Medications:  I do recommend formal physical therapy or at minimum a home exercise program.   Over the counter analgesic (pain-relieving) medications can help. Examples are Tylenol, Ibuprofen, and Aleve. Check with your primary care physician to make sure you don't have contra-indications to taking those medicines.  Some over the counter supplement solutions such as glucosamine and chondroitin may help with symptoms, although the evidence is mixed.    Healthy  Lifestyle:  Excess body weight can have a negative impact on joint health and on pain. I recommend healthy lifestyle choices including nutrition and exercise that help reach and maintain an ideal body weight. Tips for Exercise: https://www.payleven/13-exercise-tips-for-a-healthier-you/  Some diets cause increased inflammation. I recommend a balanced wholesome diet including some foods such as olives that are shown to decrease inflammation. More diet information available here: https://www.DokDok.TwentyPeople/8-best-foods-for-knee-arthritis/      Follow-up: 6 weeks or sooner if there are any problems between now and then.    Leave Review:   Google: Leave Google Review  Healthgrades: Leave Healthgrades Review    After Hours Number: [phone]       Provider Note/Medical Decision Making: Today's visit is associated with current or anticipated ongoing medical care related to this patient's diagnosis of osteoarthritis. Currently there is no cure of osteoarthritis and the patient will require regular follow up to manage symptoms and progression. The patient is to return to the office within a minimum of 3-6 months to review symptoms and function at that time. CPT code          I discussed worrisome and red flag signs and symptoms with the patient. The patient expressed understanding and agreed to alert me immediately or to go to the emergency room if they experience any of these.   Treatment plan was developed with input from the patient/family, and they expressed understanding and agreement with the plan. All questions were answered today.          Cachorro Ramirez MD  Orthopaedic Surgery & Sports Medicine       Disclaimer: This note was prepared using a voice recognition system and is likely to have sound alike errors within the text.     I, Cally Aparicio, acted as a scribe for Cachorro Ramirez MD for the duration of this office visit.

## 2024-08-30 NOTE — PROCEDURES
Large Joint Aspiration/Injection: R subacromial bursa    Date/Time: 8/29/2024 3:15 PM    Performed by: Cachorro Ramirez MD  Authorized by: Cachorro Ramirez MD    Consent Done?:  Yes (Written)  Indications:  Diagnostic evaluation and pain  Site marked: the procedure site was marked    Timeout: prior to procedure the correct patient, procedure, and site was verified    Prep: patient was prepped and draped in usual sterile fashion    Local anesthesia used?: No    Anesthesia:  Local infiltration  Local anesthetic:  Lidocaine 1% without epinephrine, bupivacaine 0.5% without epinephrine and topical anesthetic    Details:  Needle Size:  22 G  Ultrasonic Guidance for needle placement?: No    Approach:  Posterior  Location:  Shoulder  Site:  R subacromial bursa  Medications:  80 mg methylPREDNISolone acetate 80 mg/mL  Patient tolerance:  Patient tolerated the procedure well with no immediate complications     2cc 1% lidocaine plain, 2cc 0.5% marcaine plain, 1cc 80mg methylprednisolone    Procedure Note:  We discussed the risk and benefits of injections, including pain, infection, bleeding, damage to adjacent structures, risk of reaction to injection. We discussed the steroid/cortisone injections will not heal the problem but mat help decrease inflammation and help with symptoms. We discussed the risk of repeated injections. The patient expressed understanding and wanted to proceed with the injection. We performed a timeout to verify the proper patient, proper procedure, and the proper site. The injection site was prepared in a sterile fashion. The patient tolerated it well and there were no complication. We did discuss with the patient that steroid injections can cause some increase in blood sugar and blood pressure for up to a week after the injection.

## 2024-09-01 RX ORDER — METHYLPREDNISOLONE ACETATE 80 MG/ML
80 INJECTION, SUSPENSION INTRA-ARTICULAR; INTRALESIONAL; INTRAMUSCULAR; SOFT TISSUE
Status: DISCONTINUED | OUTPATIENT
Start: 2024-08-29 | End: 2024-09-01 | Stop reason: HOSPADM

## 2024-09-06 ENCOUNTER — CLINICAL SUPPORT (OUTPATIENT)
Dept: ALLERGY | Facility: CLINIC | Age: 44
End: 2024-09-06
Payer: COMMERCIAL

## 2024-09-06 DIAGNOSIS — J30.89 ALLERGIC RHINITIS DUE TO DERMATOPHAGOIDES FARINAE: ICD-10-CM

## 2024-09-06 DIAGNOSIS — J30.89 ALLERGIC RHINITIS DUE TO DUST MITE: Primary | ICD-10-CM

## 2024-09-06 PROCEDURE — 99999 PR PBB SHADOW E&M-EST. PATIENT-LVL II: CPT | Mod: PBBFAC,,,

## 2024-09-09 ENCOUNTER — CLINICAL SUPPORT (OUTPATIENT)
Facility: HOSPITAL | Age: 44
End: 2024-09-09
Attending: ORTHOPAEDIC SURGERY
Payer: COMMERCIAL

## 2024-09-09 DIAGNOSIS — G89.29 CHRONIC PAIN OF BOTH SHOULDERS: Primary | ICD-10-CM

## 2024-09-09 DIAGNOSIS — M25.611 DECREASED RANGE OF MOTION OF RIGHT SHOULDER: ICD-10-CM

## 2024-09-09 DIAGNOSIS — M25.511 CHRONIC PAIN OF BOTH SHOULDERS: Primary | ICD-10-CM

## 2024-09-09 DIAGNOSIS — M62.511 MUSCLE WASTING AND ATROPHY, NOT ELSEWHERE CLASSIFIED, RIGHT SHOULDER: ICD-10-CM

## 2024-09-09 DIAGNOSIS — M75.41 ROTATOR CUFF IMPINGEMENT SYNDROME OF RIGHT SHOULDER: ICD-10-CM

## 2024-09-09 DIAGNOSIS — M25.512 CHRONIC PAIN OF BOTH SHOULDERS: Primary | ICD-10-CM

## 2024-09-09 PROCEDURE — 97161 PT EVAL LOW COMPLEX 20 MIN: CPT | Mod: PN

## 2024-09-09 PROCEDURE — 97110 THERAPEUTIC EXERCISES: CPT | Mod: PN

## 2024-09-09 NOTE — PLAN OF CARE
[facility]   Physical Therapy Initial Evaluation        Date: 9/9/2024   Name: Selena Spring  Clinic Number: [id number]    Therapy Diagnosis:    Encounter Diagnoses   Name Primary?    Rotator cuff impingement syndrome of right shoulder     Chronic pain of both shoulders Yes    Decreased range of motion of right shoulder     Muscle wasting and atrophy, not elsewhere classified, right shoulder       Physician: Cachorro Ramirez MD     Physician Orders: PT Eval and Treat  Medical Diagnosis from Referral: Rotator Cuff Impingement Syndrome of Right Shoulder  Evaluation Date: 9/9/2024  Authorization Period Expiration: 9/30/24  Plan of Care Expiration: 12/2/24  Visit # / Visits authorized: 1/1  FOTO: 1/3    Progress Note Due: 10/9/24    Precautions: Standard    Time In: 1:15 pm  Time Out: 2:25 pm  Total Billable Time (timed & untimed codes): 60 minutes    SUBJECTIVE   Date of onset: several years ago   History of current condition - Selena is a 43 y.o. female whom reports bilateral shoulder pain, R>L. Mechanism of injury: unknown. Pain is worse. Patient states she has had 1 previous surgery on her R shoulder and 2 on her L for the removal of bone spurs.     Falls: none    Pain:  Current 4/10, worst 9/10, best 3/10   Location: R shoulder > L shoulder   Description: tightness, ache  Aggravating Factors: weight bearing through RUE, prolonged computer work  Easing Factors: massage, heat give temporary relief    Imaging: X-ray performed on 8/29/24    Prior Therapy: Yes  Social History: Pt lives alone  Occupation:   Prior Level of Function: Independent and pain free with all ADL, IADL, community mobility and functional activities.   Current Level of Function: Patient experiences quite a bit of difficulty with their typical ADL's.     Dominant Extremity: right    Pts goals: Pt reported goals are to decrease overall pain levels in order to return to prior functional level.       Medical  History:   No past medical history on file.    Surgical History:   Selena Spring  has a past surgical history that includes Injection of joint (Right, 3/10/2021).    Medications:   Selena has a current medication list which includes the following prescription(s): azelastine, clindamycin-benzoyl peroxide, epinephrine, fluticasone propionate, gabapentin, lamotrigine, omeprazole, prazosin, sertraline, tizanidine, triamcinolone acetonide 0.1%, and urea.    Allergies:   Review of patient's allergies indicates:   Allergen Reactions    Sulfa (sulfonamide antibiotics) Hives        OBJECTIVE     RANGE OF MOTION:  *denotes pain     Cervical ROM  (Degrees) Right    Left    Goal   Cervical Flexion  60 -   Cervical Extension  60 -   Cervical Side Bending  30* 45 45   Cervical Rotation  60* 80 90     Shoulder ROM  (Degrees) Right Left Goal   Shoulder Flexion 160 160 -   Shoulder Extension  30 60 60   Shoulder Abduction  160 160 -   Functional ER Funcitonal Nonpainful  Funcitonal Nonpainful  -   Functional IR Funcitonal Nonpainful  Funcitonal Nonpainful  -         STRENGTH:  *denotes pain     MMT Right Left Goal   Infraspinatus  4+/5* 4+/5 5/5   Subscapularis  5/5* 5/5* 5/5   Supraspinatus  4+/5 5/5 5/5   Teres Minor 4+/5 4+/5* 5/5   Rhomboid NT NT 5/5   Lower Trapezius  NT NT 5/5   Latissimus Dorsi NT NT 5/5         SPECIAL TESTS:     Right  (spine) Left    Impingement Sign Negative Negative   Medina-Magdy Negative Negative         FUNCTION:     Intake Outcome Measure for FOTO Shoulder Survey    Therapist reviewed FOTO scores for Selena Spring on 9/9/2024.   FOTO report - see Media section or FOTO account episode details.    Intake Score: 62%           TREATMENT         THERAPEUTIC EXERCISES to develop strength, endurance, ROM, flexibility, posture, and core stabilization for (10) minutes including:  Intervention Performed Today     Rows  x  Blue, 2x10   ER  x  Red, 2x10   IR x  Blue, 2x10   Bilateral ER x  Red,  2x10           Home Exercises and Patient Education Provided    Education/Self-Care provided: (5) minutes  Issued HEP for rotator cuff strengthening.    Written Home Exercises Provided: yes.  Exercises were reviewed and Selena was able to demonstrate them prior to the end of the session.  Selena demonstrated good understanding of the education provided.     See EMR under Patient Instructions for exercises provided 9/9/2024.    ASSESSMENT   Selena is a 43 y.o. female referred to outpatient Physical Therapy with a medical diagnosis of Rotator Cuff Impingement Syndrome of Right Shoulder. Pt presents with impairments including: impairments list: ROM, strength, and functional movement patterns.    Pt prognosis is Excellent.   Pt will benefit from skilled outpatient Physical Therapy to address the deficits stated above and in the chart below, provide pt/family education, and to maximize pt's level of independence.     Plan of care discussed with patient: Yes  Pt's spiritual, cultural and educational needs considered and patient is agreeable to the plan of care and goals as stated below:     Anticipated Barriers for therapy: none    Medical Necessity is demonstrated by the following  History  Co-morbidities and personal factors that may impact the plan of care [x] LOW: no personal factors / co-morbidities  [] MODERATE: 1-2 personal factors / co-morbidities  [] HIGH: 3+ personal factors / co-morbidities    Moderate / High Support Documentation: See Past Medical History     Examination  Body Structures and Functions, activity limitations and participation restrictions that may impact the plan of care [] LOW: addressing 1-2 elements  [x] MODERATE: 3+ elements  [] HIGH: 4+ elements (please support below)    Moderate / High Support Documentation: See Evaluation Above     Clinical Presentation [] LOW: stable  [x] MODERATE: Evolving  [] HIGH: Unstable     Decision Making/ Complexity Score: low         GOALS:    Short Term Goals:   6 weeks Progress      Patient will report decreased pain to <6/10 at worst on VAS to progress toward Prior Level of Function.    Patient will report improved function by score of >70 out of 100 on FOTO.    Patient will improve AROM to 50% of stated goals in order to progress towards Prior Level of Function.    Patient will improve strength to 50% of stated goals in order to progress towards Prior Level of Function.      Long Term Goals:  12 weeks Progress     Patient will report decreased pain to <3/10 at worst on VAS to progress toward Prior Level of Function.      Patient will report improved function by a score of >90 out of 100 on FOTO.    Patient will improve ROM and Functional Mobility to stated goals to return to Prior Level of Function.    Patient will improve strength to stated goals in order to return to Prior Level of Function.         PLAN   Plan of care Certification: 9/9/2024 to 12/2/24     Outpatient Physical Therapy 3 times weekly for 12 weeks to include any combination of the following interventions: virtual visits, dry needling, modalities, electrical stimulation (IFC, Pre-Mod, Attended with Functional Dry Needling), Cervical/Lumbar Traction, Gait Training, Manual Therapy, Neuromuscular Re-ed, Patient Education, Self Care, Therapeutic Activites, and Therapeutic Exercise     Thank you for this referral.    These services are reasonable and necessary for the conditions set forth above while under my care.    Ralph Potts, PT, DPT, SCS

## 2024-09-13 ENCOUNTER — CLINICAL SUPPORT (OUTPATIENT)
Dept: ALLERGY | Facility: CLINIC | Age: 44
End: 2024-09-13
Payer: COMMERCIAL

## 2024-09-13 ENCOUNTER — CLINICAL SUPPORT (OUTPATIENT)
Facility: HOSPITAL | Age: 44
End: 2024-09-13
Payer: COMMERCIAL

## 2024-09-13 DIAGNOSIS — M25.511 CHRONIC PAIN OF BOTH SHOULDERS: Primary | ICD-10-CM

## 2024-09-13 DIAGNOSIS — J30.89 ALLERGIC RHINITIS DUE TO DERMATOPHAGOIDES FARINAE: ICD-10-CM

## 2024-09-13 DIAGNOSIS — M25.611 DECREASED RANGE OF MOTION OF RIGHT SHOULDER: ICD-10-CM

## 2024-09-13 DIAGNOSIS — M25.512 CHRONIC PAIN OF BOTH SHOULDERS: Primary | ICD-10-CM

## 2024-09-13 DIAGNOSIS — J45.909: ICD-10-CM

## 2024-09-13 DIAGNOSIS — J30.89 ALLERGIC RHINITIS DUE TO DUST MITE: Primary | ICD-10-CM

## 2024-09-13 DIAGNOSIS — G89.29 CHRONIC PAIN OF BOTH SHOULDERS: Primary | ICD-10-CM

## 2024-09-13 DIAGNOSIS — M62.511 MUSCLE WASTING AND ATROPHY, NOT ELSEWHERE CLASSIFIED, RIGHT SHOULDER: ICD-10-CM

## 2024-09-13 PROCEDURE — 97112 NEUROMUSCULAR REEDUCATION: CPT | Mod: PN

## 2024-09-13 PROCEDURE — 97110 THERAPEUTIC EXERCISES: CPT | Mod: PN

## 2024-09-13 PROCEDURE — 97140 MANUAL THERAPY 1/> REGIONS: CPT | Mod: PN

## 2024-09-13 PROCEDURE — 99999 PR PBB SHADOW E&M-EST. PATIENT-LVL II: CPT | Mod: PBBFAC,,,

## 2024-09-13 NOTE — PROGRESS NOTES
"[facility]   Physical Therapy Treatment Note     Name: Selena Skelton Clementine  Clinic Number: [id number]    Therapy Diagnosis:   Encounter Diagnoses   Name Primary?    Chronic pain of both shoulders Yes    Decreased range of motion of right shoulder     Muscle wasting and atrophy, not elsewhere classified, right shoulder      Physician: Cachorro Ramirez MD    Visit Date: 9/13/2024    Physician Orders: PT Eval and Treat  Medical Diagnosis from Referral: Rotator Cuff Impingement Syndrome of Right Shoulder  Evaluation Date: 9/9/2024  Authorization Period Expiration: 9/30/24  Plan of Care Expiration: 12/2/24  Visit # / Visits authorized: 1/20 (+1 for evaluation)  FOTO: 1/3    Progress Note Due: 10/9/24    Precautions: Standard    Time In: 3:00 pm   Time Out: 4:00 pm   Total Billable Time: 60 minutes    SUBJECTIVE     Pt reports: increased pain/irritation today due to increased participation in lifting/carrying for ADL's.  She was compliant with home exercise program.  Response to previous treatment: demonstrated understanding of HEP.   Functional change: independent with HEP.    Pain:  Current 4/10, worst 9/10  Location: R shoulder > L shoulder    OBJECTIVE     Objective Measures updated at progress report unless specified.     Treatment     Selena received the treatments listed below:      Intervention Code  (see below chart) Performed   Today Date/Notes  9/13/24   GH Mobs         Dry Needling MT X R Infraspinatus, Teres Minor, Supraspinatus, and Deltoid   Astym         UBE TE X 2'/2'   Ext with Wand TE X 10"x5   Rows NMR X Blue, 2x10   Lat Pulls NMR X 10#, 2x10   ER NMR X Red, 2x10 - jael   IR NMR X Blue, 2x10 - jael   Jael ER NMR X Red, 2x10   Serratus Punch NMR X 10#, 2x10             15 minutes of Manual therapy (MT) to improve pain and ROM.  10 minutes of Therapeutic Exercise (TE) to develop strength, endurance, ROM, and flexibility.  30 minutes of Neuromuscular Re-Education (NMR)  to " improve: Balance, Coordination, Kinesthetic, Sense, Proprioception, and Posture.        Patient Education and Home Exercises     Home Exercises Provided and Patient Education Provided     Education provided:   - reviewed for understanding.    Written Home Exercises Provided: Patient instructed to cont prior HEP. Exercises were reviewed and Selena was able to demonstrate them prior to the end of the session.  Selena demonstrated good  understanding of the education provided. See EMR under Patient Instructions for exercises provided during therapy sessions    ASSESSMENT   Performed Dry Needling during MT to improve shld soft tissue mobility and pain. Added Ext with Wand during TE to improve shld extension ROM. Added Lat Pulls and Serratus Punch during NMR to improve lower trap and serratus anterior motor control.         Selena Is progressing well towards her goals.   Pt prognosis is Excellent.     Pt will continue to benefit from skilled outpatient physical therapy to address the deficits listed in the problem list box on initial evaluation, provide pt/family education and to maximize pt's level of independence in the home and community environment.     Pt's spiritual, cultural and educational needs considered and pt agreeable to plan of care and goals.     Anticipated barriers to physical therapy: none    GOALS:    Short Term Goals:  6 weeks Progress      Patient will report decreased pain to <6/10 at worst on VAS to progress toward Prior Level of Function. Progressing    Patient will report improved function by score of >70 out of 100 on FOTO.    Patient will improve AROM to 50% of stated goals in order to progress towards Prior Level of Function.    Patient will improve strength to 50% of stated goals in order to progress towards Prior Level of Function.      Long Term Goals:  12 weeks Progress     Patient will report decreased pain to <3/10 at worst on VAS to progress toward Prior Level of Function.      Patient  will report improved function by a score of >90 out of 100 on FOTO.    Patient will improve ROM and Functional Mobility to stated goals to return to Prior Level of Function.    Patient will improve strength to stated goals in order to return to Prior Level of Function.        PLAN   Continue Plan of Care (POC) and progress per patient tolerance. See treatment section for details on planned progressions next session.      Ralph Potts, PT, DPT, SCS

## 2024-09-17 ENCOUNTER — CLINICAL SUPPORT (OUTPATIENT)
Facility: HOSPITAL | Age: 44
End: 2024-09-17
Payer: COMMERCIAL

## 2024-09-17 DIAGNOSIS — M25.611 DECREASED RANGE OF MOTION OF RIGHT SHOULDER: ICD-10-CM

## 2024-09-17 DIAGNOSIS — M25.512 CHRONIC PAIN OF BOTH SHOULDERS: Primary | ICD-10-CM

## 2024-09-17 DIAGNOSIS — M62.511 MUSCLE WASTING AND ATROPHY, NOT ELSEWHERE CLASSIFIED, RIGHT SHOULDER: ICD-10-CM

## 2024-09-17 DIAGNOSIS — M25.511 CHRONIC PAIN OF BOTH SHOULDERS: Primary | ICD-10-CM

## 2024-09-17 DIAGNOSIS — G89.29 CHRONIC PAIN OF BOTH SHOULDERS: Primary | ICD-10-CM

## 2024-09-17 PROCEDURE — 97112 NEUROMUSCULAR REEDUCATION: CPT | Mod: PN

## 2024-09-17 PROCEDURE — 97140 MANUAL THERAPY 1/> REGIONS: CPT | Mod: PN

## 2024-09-17 PROCEDURE — 97110 THERAPEUTIC EXERCISES: CPT | Mod: PN

## 2024-09-17 NOTE — PROGRESS NOTES
"[facility]   Physical Therapy Treatment Note     Name: Selena Spring  Clinic Number: [id number]    Therapy Diagnosis:   Encounter Diagnoses   Name Primary?    Chronic pain of both shoulders Yes    Decreased range of motion of right shoulder     Muscle wasting and atrophy, not elsewhere classified, right shoulder        Physician: Cachorro Ramirez MD    Visit Date: 9/17/2024    Physician Orders: PT Eval and Treat  Medical Diagnosis from Referral: Rotator Cuff Impingement Syndrome of Right Shoulder  Evaluation Date: 9/9/2024  Authorization Period Expiration: 9/30/24  Plan of Care Expiration: 12/2/24  Visit # / Visits authorized: 2/20 (+1 for evaluation)  FOTO: 1/3    Progress Note Due: 10/9/24    Precautions: Standard    Time In: 1:00 pm   Time Out: 2:00 pm   Total Billable Time: 60 minutes    SUBJECTIVE     Pt reports: moderate to severe pain in her shoulder today.   Response to previous treatment: soreness in posterior rotator cuff.   Functional change: independent with HEP.    Pain:  Current 6/10, worst 9/10  Location: R shoulder > L shoulder    OBJECTIVE     Objective Measures updated at progress report unless specified.     Treatment     Selena received the treatments listed below:      Intervention Code  (see below chart) Performed   Today Date/Notes  9/17/24   GH Mobs         Dry Needling MT X R Infraspinatus, Teres Minor, Supraspinatus, and Deltoid   Astym         UBE TE X 2'/2'   Ext with Wand TE X 10"x5   Rows NMR X Blue, 2x10   Lat Pulls NMR X 10#, 1x10   ER NMR X Red, 2x10 - garrett did blue   IR NMR X Blue, 2x10 - garrett   Garrett ER NMR X Red, 2x10   Serratus Punch NMR X 10#, 1x1             15 minutes of Manual therapy (MT) to improve pain and ROM.  10 minutes of Therapeutic Exercise (TE) to develop strength, endurance, ROM, and flexibility.  30 minutes of Neuromuscular Re-Education (NMR)  to improve: Balance, Coordination, Kinesthetic, Sense, Proprioception, and " Posture.        Patient Education and Home Exercises     Home Exercises Provided and Patient Education Provided     Education provided:   - reviewed for understanding.    Written Home Exercises Provided: Patient instructed to cont prior HEP. Exercises were reviewed and Selena was able to demonstrate them prior to the end of the session.  Selena demonstrated good  understanding of the education provided. See EMR under Patient Instructions for exercises provided during therapy sessions    ASSESSMENT   Performed Dry Needling during MT to alleviate shoulder pain and allow for improved participation in exercises. Decreased sets of Lat Pulls and Serratus Punch to decrease exercise induced soreness and allow for improved motor control of shoulder musculature.         Selena Is progressing well towards her goals.   Pt prognosis is Excellent.     Pt will continue to benefit from skilled outpatient physical therapy to address the deficits listed in the problem list box on initial evaluation, provide pt/family education and to maximize pt's level of independence in the home and community environment.     Pt's spiritual, cultural and educational needs considered and pt agreeable to plan of care and goals.     Anticipated barriers to physical therapy: none    GOALS:    Short Term Goals:  6 weeks Progress      Patient will report decreased pain to <6/10 at worst on VAS to progress toward Prior Level of Function. Progressing    Patient will report improved function by score of >70 out of 100 on FOTO.    Patient will improve AROM to 50% of stated goals in order to progress towards Prior Level of Function.    Patient will improve strength to 50% of stated goals in order to progress towards Prior Level of Function.      Long Term Goals:  12 weeks Progress     Patient will report decreased pain to <3/10 at worst on VAS to progress toward Prior Level of Function.      Patient will report improved function by a score of >90 out of 100  on FOTO.    Patient will improve ROM and Functional Mobility to stated goals to return to Prior Level of Function.    Patient will improve strength to stated goals in order to return to Prior Level of Function.        PLAN   Continue Plan of Care (POC) and progress per patient tolerance. See treatment section for details on planned progressions next session.      Ralph Potts, PT, DPT, SCS

## 2024-09-19 ENCOUNTER — CLINICAL SUPPORT (OUTPATIENT)
Dept: ALLERGY | Facility: CLINIC | Age: 44
End: 2024-09-19
Payer: COMMERCIAL

## 2024-09-19 DIAGNOSIS — J45.909: ICD-10-CM

## 2024-09-19 DIAGNOSIS — J30.89 ALLERGIC RHINITIS DUE TO DUST MITE: Primary | ICD-10-CM

## 2024-09-19 DIAGNOSIS — J30.89 ALLERGIC RHINITIS DUE TO DERMATOPHAGOIDES FARINAE: ICD-10-CM

## 2024-09-19 PROCEDURE — 95117 IMMUNOTHERAPY INJECTIONS: CPT | Mod: S$GLB,,, | Performed by: STUDENT IN AN ORGANIZED HEALTH CARE EDUCATION/TRAINING PROGRAM

## 2024-09-19 PROCEDURE — 99999 PR PBB SHADOW E&M-EST. PATIENT-LVL II: CPT | Mod: PBBFAC,,,

## 2024-09-23 ENCOUNTER — CLINICAL SUPPORT (OUTPATIENT)
Facility: HOSPITAL | Age: 44
End: 2024-09-23
Payer: COMMERCIAL

## 2024-09-23 DIAGNOSIS — M25.511 CHRONIC PAIN OF BOTH SHOULDERS: Primary | ICD-10-CM

## 2024-09-23 DIAGNOSIS — M25.611 DECREASED RANGE OF MOTION OF RIGHT SHOULDER: ICD-10-CM

## 2024-09-23 DIAGNOSIS — M62.511 MUSCLE WASTING AND ATROPHY, NOT ELSEWHERE CLASSIFIED, RIGHT SHOULDER: ICD-10-CM

## 2024-09-23 DIAGNOSIS — G89.29 CHRONIC PAIN OF BOTH SHOULDERS: Primary | ICD-10-CM

## 2024-09-23 DIAGNOSIS — M25.512 CHRONIC PAIN OF BOTH SHOULDERS: Primary | ICD-10-CM

## 2024-09-23 PROCEDURE — 97140 MANUAL THERAPY 1/> REGIONS: CPT | Mod: PN

## 2024-09-23 PROCEDURE — 97112 NEUROMUSCULAR REEDUCATION: CPT | Mod: PN

## 2024-09-23 PROCEDURE — 97110 THERAPEUTIC EXERCISES: CPT | Mod: PN

## 2024-09-24 ENCOUNTER — CLINICAL SUPPORT (OUTPATIENT)
Dept: ALLERGY | Facility: CLINIC | Age: 44
End: 2024-09-24
Payer: COMMERCIAL

## 2024-09-24 DIAGNOSIS — J30.89 ALLERGIC RHINITIS DUE TO DUST MITE: Primary | ICD-10-CM

## 2024-09-24 DIAGNOSIS — J30.89 ALLERGIC RHINITIS DUE TO DERMATOPHAGOIDES FARINAE: ICD-10-CM

## 2024-09-24 DIAGNOSIS — J45.909: ICD-10-CM

## 2024-09-24 NOTE — PROGRESS NOTES
"[facility]   Physical Therapy Treatment Note     Name: Selena Spring  Clinic Number: [id number]    Therapy Diagnosis:   Encounter Diagnoses   Name Primary?    Chronic pain of both shoulders Yes    Decreased range of motion of right shoulder     Muscle wasting and atrophy, not elsewhere classified, right shoulder        Physician: Cachorro Ramirez MD    Visit Date: 9/23/2024    Physician Orders: PT Eval and Treat  Medical Diagnosis from Referral: Rotator Cuff Impingement Syndrome of Right Shoulder  Evaluation Date: 9/9/2024  Authorization Period Expiration: 9/30/24  Plan of Care Expiration: 12/2/24  Visit # / Visits authorized: 3/20 (+1 for evaluation)  FOTO: 1/3    Progress Note Due: 10/9/24    Precautions: Standard    Time In: 10:30 pm   Time Out: 11:30 pm   Total Billable Time: 60 minutes    SUBJECTIVE     Pt reports: moderate to severe pain in her shoulder today.   Response to previous treatment: soreness in posterior rotator cuff.   Functional change: independent with HEP.    Pain:  Current 6/10, worst 9/10  Location: R shoulder > L shoulder    OBJECTIVE     Objective Measures updated at progress report unless specified.     Treatment     Selena received the treatments listed below:      Intervention Code  (see below chart) Performed   Today Date/Notes  9/23/24   GH Mobs         Dry Needling MT X Garrett UT and LS   Astym         UBE TE X 2'/2'   Foam Roll Series TE X Pos 1-7: 10x each   Ext with Wand TE X 10"x5   Rows NMR X Blue, 3x10   Lat Pulls NMR X 10#, 1x10   ER NMR X Blue, 3x10 - garrett    IR NMR X Blue, 2x10 - garrett   Garrett ER NMR X Red, 3x10   Serratus Punch NMR X 5#, 2x10             15 minutes of Manual therapy (MT) to improve pain and ROM.  10 minutes of Therapeutic Exercise (TE) to develop strength, endurance, ROM, and flexibility.  30 minutes of Neuromuscular Re-Education (NMR)  to improve: Balance, Coordination, Kinesthetic, Sense, Proprioception, and " Posture.        Patient Education and Home Exercises     Home Exercises Provided and Patient Education Provided     Education provided:   - reviewed for understanding.    Written Home Exercises Provided: Patient instructed to cont prior HEP. Exercises were reviewed and Selena was able to demonstrate them prior to the end of the session.  Selena demonstrated good  understanding of the education provided. See EMR under Patient Instructions for exercises provided during therapy sessions    ASSESSMENT   Performed Dry Needling during MT to alleviate shoulder pain and allow for improved participation in exercises. Added Foam Roll Series during TE to improve shoulder and thoracic ROM. Increased sets of Bilateral ER and Serratus Punch during NMR to improve motor control of scapular stabilizers.         Selena Is progressing well towards her goals.   Pt prognosis is Excellent.     Pt will continue to benefit from skilled outpatient physical therapy to address the deficits listed in the problem list box on initial evaluation, provide pt/family education and to maximize pt's level of independence in the home and community environment.     Pt's spiritual, cultural and educational needs considered and pt agreeable to plan of care and goals.     Anticipated barriers to physical therapy: none    GOALS:    Short Term Goals:  6 weeks Progress      Patient will report decreased pain to <6/10 at worst on VAS to progress toward Prior Level of Function. Progressing    Patient will report improved function by score of >70 out of 100 on FOTO.    Patient will improve AROM to 50% of stated goals in order to progress towards Prior Level of Function.    Patient will improve strength to 50% of stated goals in order to progress towards Prior Level of Function.      Long Term Goals:  12 weeks Progress     Patient will report decreased pain to <3/10 at worst on VAS to progress toward Prior Level of Function.      Patient will report improved  function by a score of >90 out of 100 on FOTO.    Patient will improve ROM and Functional Mobility to stated goals to return to Prior Level of Function.    Patient will improve strength to stated goals in order to return to Prior Level of Function.        PLAN   Continue Plan of Care (POC) and progress per patient tolerance. See treatment section for details on planned progressions next session.      Ralph Potts, PT, DPT, SCS

## 2024-09-25 ENCOUNTER — PATIENT MESSAGE (OUTPATIENT)
Dept: ALLERGY | Facility: CLINIC | Age: 44
End: 2024-09-25
Payer: COMMERCIAL

## 2024-10-08 ENCOUNTER — PATIENT MESSAGE (OUTPATIENT)
Dept: SPORTS MEDICINE | Facility: CLINIC | Age: 44
End: 2024-10-08
Payer: COMMERCIAL

## 2024-10-08 ENCOUNTER — PATIENT MESSAGE (OUTPATIENT)
Dept: ALLERGY | Facility: CLINIC | Age: 44
End: 2024-10-08
Payer: COMMERCIAL

## 2024-10-22 ENCOUNTER — OFFICE VISIT (OUTPATIENT)
Dept: OTOLARYNGOLOGY | Facility: CLINIC | Age: 44
End: 2024-10-22
Payer: COMMERCIAL

## 2024-10-22 VITALS — BODY MASS INDEX: 36.91 KG/M2 | WEIGHT: 235.69 LBS

## 2024-10-22 DIAGNOSIS — H72.92 TYMPANIC MEMBRANE PERFORATION, LEFT: ICD-10-CM

## 2024-10-22 DIAGNOSIS — R42 DIZZY SPELLS: Primary | ICD-10-CM

## 2024-10-22 PROCEDURE — 1159F MED LIST DOCD IN RCRD: CPT | Mod: CPTII,S$GLB,, | Performed by: PHYSICIAN ASSISTANT

## 2024-10-22 PROCEDURE — 99204 OFFICE O/P NEW MOD 45 MIN: CPT | Mod: S$GLB,,, | Performed by: PHYSICIAN ASSISTANT

## 2024-10-22 PROCEDURE — 99999 PR PBB SHADOW E&M-EST. PATIENT-LVL III: CPT | Mod: PBBFAC,,, | Performed by: PHYSICIAN ASSISTANT

## 2024-10-22 PROCEDURE — 3008F BODY MASS INDEX DOCD: CPT | Mod: CPTII,S$GLB,, | Performed by: PHYSICIAN ASSISTANT

## 2024-10-22 NOTE — PROGRESS NOTES
Subjective:   Patient ID: Selena Spring is a 44 y.o. female.    Chief Complaint: Dizziness (Pt is coming in today for vertigo and head pressure and pt states that objects moving too fast can throw her off pt states that she had vertigo somes years ago where her crystals were out of whack and it was fixed but this feels different it happens when she is walking too much or her sitting still and things moving around her too fast or her watching tv and shakey or things moving too fast makes her dizy)    Ms. Spring is coming in with complaints of vertigo and head pressure.  Triggered by objects moving too fast can throw her off. She states that she had BPPV several years ago but this feels different. She has appointment with neurology scheduled and had her eyes checked this morning. She does have a h/o environmental allergies- she knows she is allergic to dust mites. She started trial of allergy shots but stopped because it triggered HA and dizziness. Denies any other URI symptoms or h/o migraines.     Dizziness:    Associated symptoms: headaches.no hearing loss, no ear pain, no fever, no tinnitus, no light-headedness, no palpitations and no chest pain.    Review of patient's allergies indicates:   Allergen Reactions    Sulfa (sulfonamide antibiotics) Hives           Review of Systems   Constitutional:  Negative for chills, fatigue, fever and unexpected weight change.   HENT:  Negative for congestion, dental problem, ear discharge, ear pain, facial swelling, hearing loss, nosebleeds, postnasal drip, rhinorrhea, sinus pressure, sneezing, sore throat, tinnitus, trouble swallowing and voice change.    Eyes:  Positive for visual disturbance. Negative for redness and itching.   Respiratory:  Negative for cough, choking, shortness of breath and wheezing.    Cardiovascular:  Negative for chest pain and palpitations.   Gastrointestinal:  Negative for abdominal pain.        No reflux.   Musculoskeletal:  Negative for gait  problem.   Skin:  Negative for rash.   Neurological:  Positive for dizziness and headaches. Negative for light-headedness.         Objective:   Wt 106.9 kg (235 lb 10.8 oz)   BMI 36.91 kg/m²     Physical Exam  Constitutional:       General: She is not in acute distress.     Appearance: She is well-developed.   HENT:      Head: Normocephalic and atraumatic.      Right Ear: Tympanic membrane, ear canal and external ear normal.      Left Ear: Ear canal and external ear normal. Tympanic membrane is perforated (large).      Ears:      Comments: NEGATIVE FOR BPPV     Nose: Nose normal. No nasal deformity, septal deviation, mucosal edema or rhinorrhea.      Right Sinus: No maxillary sinus tenderness or frontal sinus tenderness.      Left Sinus: No maxillary sinus tenderness or frontal sinus tenderness.      Mouth/Throat:      Mouth: Mucous membranes are not pale and not dry.      Dentition: No dental caries.      Pharynx: Uvula midline. No oropharyngeal exudate or posterior oropharyngeal erythema.   Eyes:      General: Lids are normal. No scleral icterus.     Extraocular Movements:      Right eye: Normal extraocular motion and no nystagmus.      Left eye: Normal extraocular motion and no nystagmus.      Conjunctiva/sclera: Conjunctivae normal.      Right eye: Right conjunctiva is not injected. No chemosis.     Left eye: Left conjunctiva is not injected. No chemosis.     Pupils: Pupils are equal, round, and reactive to light.   Neck:      Thyroid: No thyroid mass or thyromegaly.      Trachea: Trachea and phonation normal. No tracheal tenderness or tracheal deviation.   Pulmonary:      Effort: Pulmonary effort is normal. No respiratory distress.      Breath sounds: No stridor.   Abdominal:      General: There is no distension.   Lymphadenopathy:      Head:      Right side of head: No submental, submandibular, preauricular, posterior auricular or occipital adenopathy.      Left side of head: No submental, submandibular,  preauricular, posterior auricular or occipital adenopathy.      Cervical: No cervical adenopathy.   Skin:     General: Skin is warm and dry.      Findings: No erythema or rash.   Neurological:      Mental Status: She is alert and oriented to person, place, and time.      Cranial Nerves: No cranial nerve deficit.   Psychiatric:         Behavior: Behavior normal.              Assessment:     1. Dizzy spells    2. Tympanic membrane perforation, left        Plan:     Dizzy spells    Tympanic membrane perforation, left      I had a long discussion with the patient regarding their symptoms.  Dizziness, vertigo and disequilibrium are common symptoms reported by adults during visits to their doctors. They are all symptoms that can result from a peripheral vestibular disorder (a dysfunction of the balance organs of the inner ear) or central vestibular disorder (a dysfunction of one or more parts of the central nervous system that help process balance and spatial information).  There are also non-vestibular causes of dizziness, and dizziness can be linked to a wide array of problems such as blood-flow irregularities from cardiovascular problems and blood pressure fluctuations.  I would recommend a VNG with audiogram for further diagnostic testing, and will contact the patient with further recommendations when that is complete.      Discussed TM perforation. She is not interested in seeing otology or having repair.

## 2024-10-31 NOTE — PROGRESS NOTES
"Subjective:      Patient ID: Selena Spring is a 44 y.o. female.    Chief Complaint:  " Headaches ".     HPI 44 Years old C. Female with PMHx of L - Shoulder muscles atrophy and others non pertinent Medical issues  came for the evaluation and recommendation of " Headaches ".      Started: about 5 months ago.   Describes: Throbbing.    Timing: 3 to 5 days.   Frequency: from 1 per month.   Pain:  3 to 7/ 10.   Location: whole head.   Family: None with Migraines.   Medications: Zanaflex / Tramadol / NSAID's PRN   Worsen: lights / noise / physical activities.   Neck pain - mild and occasionally / no radiation to extremities.   Alleviated: Dark quiet room / sitting quiet.   Associated symptoms:  vision changes / photophobia / nauseas / dizziness / photophobia    Triggers: none.   Prodrome symptoms: Neck discomfort - mild for the most aprt / occasionally.   Auras: none.           Self referral by PCP.        No Cervical injury / no head trauma.        Many times waking up with HA's.        Mild Helps with PRN medications.       Lamictal - mood stabilizer.     Review of Systems   Neurological:  Positive for headaches.   All other systems reviewed and are negative.    Objective:     Neurological Exam  Mental Status  Alert. Oriented to person, place, time and situation. Recent and remote memory are intact. Able to copy figure. Clock drawing is normal. Speech is normal. Language is fluent with no aphasia. Digit span was 7 forward and 1 backward. Fund of knowledge is appropriate for level of education. Apraxia absent.    Cranial Nerves  CN II: Visual acuity is normal. Visual fields full to confrontation.  CN III, IV, VI: Extraocular movements intact bilaterally. Normal lids and orbits bilaterally. Pupils equal round and reactive to light bilaterally.  CN V: Facial sensation is normal.  CN VII: Full and symmetric facial movement.  CN VIII: Hearing is normal.  CN IX, X: Palate elevates symmetrically. Normal gag reflex.  CN " "XI: Shoulder shrug strength is normal.  CN XII: Tongue midline without atrophy or fasciculations.    Motor  Normal muscle bulk throughout. No fasciculations present. Normal muscle tone. No abnormal involuntary movements. Strength is 5/5 throughout all four extremities.    Sensory  Sensation is intact to light touch, pinprick, vibration and proprioception in all four extremities.    Physical Exam  Vitals and nursing note reviewed.   Constitutional:       Appearance: Normal appearance. She is normal weight.   HENT:      Head: Normocephalic and atraumatic.      Right Ear: Tympanic membrane normal.      Left Ear: Tympanic membrane normal.      Nose: Nose normal.      Mouth/Throat:      Mouth: Mucous membranes are moist.      Pharynx: Oropharynx is clear.   Eyes:      General: Lids are normal.      Extraocular Movements: Extraocular movements intact.      Conjunctiva/sclera: Conjunctivae normal.      Pupils: Pupils are equal, round, and reactive to light.   Cardiovascular:      Rate and Rhythm: Normal rate and regular rhythm.      Pulses: Normal pulses.      Heart sounds: Normal heart sounds.   Pulmonary:      Effort: Pulmonary effort is normal.   Abdominal:      General: Abdomen is flat. Bowel sounds are normal.      Palpations: Abdomen is soft.   Genitourinary:     Comments: Deferred.   Musculoskeletal:         General: Normal range of motion.      Cervical back: Normal range of motion and neck supple.   Skin:     General: Skin is warm and dry.      Capillary Refill: Capillary refill takes less than 2 seconds.   Neurological:      Mental Status: She is alert. Mental status is at baseline.      Motor: Motor strength is normal.  Psychiatric:         Mood and Affect: Mood normal.         Speech: Speech normal.         Behavior: Behavior normal.         Thought Content: Thought content normal.         Judgment: Judgment normal.        Assessment:   44 Years old C. Female with PMHX as above came of the evaluation of " " "Headaches ".   - Migraines W/ o Auras.   Plan:   Patient Neurological Assessment is non focal.     Add Fioricet's PRN.     BP a little high - She indicated usually 120's / 130's / 80's. Keep on eye on it and discuss it with PCP.    MRI Brain - T 2 WM ( non specific / no common in a 44 years old )  changes as per report / brought disc will download and look at it.     Labs: CBC / CMP / HVI - 2024 - non significant abnormalities.     Lipids panel:  ( < 100  )     Serum Lamotrigine serum level / Vit B 12 / Folate / ESR / CRP / DONNA / TSH.     RTC 1 month.     Please do not hesitate to contact me with any updates, questions or concerns.    I spent a total of 45 minutes on the day of the visit.This includes face to face time and non-face to face time preparing to see the patient (eg, review of tests),   obtaining and/or reviewing separately obtained history, documenting clinical information in the electronic or other health record,   independently interpreting results and communicating results to the patient/family/caregiver, or care coordinator.    Lux Landis MD.  General Neurologist.   "

## 2024-11-04 ENCOUNTER — OFFICE VISIT (OUTPATIENT)
Dept: NEUROLOGY | Facility: CLINIC | Age: 44
End: 2024-11-04
Payer: COMMERCIAL

## 2024-11-04 ENCOUNTER — LAB VISIT (OUTPATIENT)
Dept: LAB | Facility: HOSPITAL | Age: 44
End: 2024-11-04
Attending: PSYCHIATRY & NEUROLOGY
Payer: COMMERCIAL

## 2024-11-04 VITALS
OXYGEN SATURATION: 99 % | HEIGHT: 67 IN | HEART RATE: 76 BPM | RESPIRATION RATE: 16 BRPM | DIASTOLIC BLOOD PRESSURE: 88 MMHG | WEIGHT: 235 LBS | SYSTOLIC BLOOD PRESSURE: 143 MMHG | BODY MASS INDEX: 36.88 KG/M2

## 2024-11-04 DIAGNOSIS — G43.009 MIGRAINE WITHOUT AURA AND WITHOUT STATUS MIGRAINOSUS, NOT INTRACTABLE: ICD-10-CM

## 2024-11-04 DIAGNOSIS — G43.009 MIGRAINE WITHOUT AURA AND WITHOUT STATUS MIGRAINOSUS, NOT INTRACTABLE: Primary | ICD-10-CM

## 2024-11-04 LAB
CRP SERPL-MCNC: 5.7 MG/L (ref 0–8.2)
ERYTHROCYTE [SEDIMENTATION RATE] IN BLOOD BY PHOTOMETRIC METHOD: 20 MM/HR (ref 0–36)
FOLATE SERPL-MCNC: 15.7 NG/ML (ref 4–24)
TSH SERPL DL<=0.005 MIU/L-ACNC: 1.15 UIU/ML (ref 0.4–4)
VIT B12 SERPL-MCNC: 835 PG/ML (ref 210–950)

## 2024-11-04 PROCEDURE — 1159F MED LIST DOCD IN RCRD: CPT | Mod: CPTII,S$GLB,, | Performed by: PSYCHIATRY & NEUROLOGY

## 2024-11-04 PROCEDURE — 80175 DRUG SCREEN QUAN LAMOTRIGINE: CPT | Performed by: PSYCHIATRY & NEUROLOGY

## 2024-11-04 PROCEDURE — 86140 C-REACTIVE PROTEIN: CPT | Performed by: PSYCHIATRY & NEUROLOGY

## 2024-11-04 PROCEDURE — 84443 ASSAY THYROID STIM HORMONE: CPT | Performed by: PSYCHIATRY & NEUROLOGY

## 2024-11-04 PROCEDURE — 85652 RBC SED RATE AUTOMATED: CPT | Performed by: PSYCHIATRY & NEUROLOGY

## 2024-11-04 PROCEDURE — 99999 PR PBB SHADOW E&M-EST. PATIENT-LVL V: CPT | Mod: PBBFAC,,, | Performed by: PSYCHIATRY & NEUROLOGY

## 2024-11-04 PROCEDURE — 1160F RVW MEDS BY RX/DR IN RCRD: CPT | Mod: CPTII,S$GLB,, | Performed by: PSYCHIATRY & NEUROLOGY

## 2024-11-04 PROCEDURE — 3079F DIAST BP 80-89 MM HG: CPT | Mod: CPTII,S$GLB,, | Performed by: PSYCHIATRY & NEUROLOGY

## 2024-11-04 PROCEDURE — 99204 OFFICE O/P NEW MOD 45 MIN: CPT | Mod: S$GLB,,, | Performed by: PSYCHIATRY & NEUROLOGY

## 2024-11-04 PROCEDURE — 3008F BODY MASS INDEX DOCD: CPT | Mod: CPTII,S$GLB,, | Performed by: PSYCHIATRY & NEUROLOGY

## 2024-11-04 PROCEDURE — 82607 VITAMIN B-12: CPT | Performed by: PSYCHIATRY & NEUROLOGY

## 2024-11-04 PROCEDURE — 3077F SYST BP >= 140 MM HG: CPT | Mod: CPTII,S$GLB,, | Performed by: PSYCHIATRY & NEUROLOGY

## 2024-11-04 PROCEDURE — 86038 ANTINUCLEAR ANTIBODIES: CPT | Performed by: PSYCHIATRY & NEUROLOGY

## 2024-11-04 PROCEDURE — 82746 ASSAY OF FOLIC ACID SERUM: CPT | Performed by: PSYCHIATRY & NEUROLOGY

## 2024-11-04 RX ORDER — DEXTROMETHORPHAN HYDROBROMIDE, BUPROPION HYDROCHLORIDE 105; 45 MG/1; MG/1
TABLET, MULTILAYER, EXTENDED RELEASE ORAL
COMMUNITY

## 2024-11-04 RX ORDER — TRAMADOL HYDROCHLORIDE 50 MG/1
50 TABLET ORAL EVERY 6 HOURS PRN
COMMUNITY
Start: 2024-10-17

## 2024-11-04 RX ORDER — HYDROXYZINE PAMOATE 25 MG/1
CAPSULE ORAL
COMMUNITY
Start: 2024-10-30

## 2024-11-04 RX ORDER — MONTELUKAST SODIUM 10 MG/1
1 TABLET ORAL NIGHTLY
COMMUNITY
Start: 2024-09-30

## 2024-11-04 RX ORDER — DESLORATADINE 5 MG/1
1 TABLET ORAL NIGHTLY PRN
COMMUNITY
Start: 2024-09-30

## 2024-11-04 RX ORDER — BUTALBITAL, ACETAMINOPHEN AND CAFFEINE 50; 325; 40 MG/1; MG/1; MG/1
1 TABLET ORAL EVERY 12 HOURS PRN
Qty: 20 TABLET | Refills: 1 | Status: SHIPPED | OUTPATIENT
Start: 2024-11-04 | End: 2024-12-04

## 2024-11-05 LAB — ANA SER QL IF: NORMAL

## 2024-11-07 LAB — LAMOTRIGINE SERPL-MCNC: 2.5 UG/ML (ref 2–15)

## 2024-11-08 ENCOUNTER — OFFICE VISIT (OUTPATIENT)
Dept: OBSTETRICS AND GYNECOLOGY | Facility: CLINIC | Age: 44
End: 2024-11-08
Payer: COMMERCIAL

## 2024-11-08 VITALS
SYSTOLIC BLOOD PRESSURE: 130 MMHG | HEIGHT: 66 IN | DIASTOLIC BLOOD PRESSURE: 80 MMHG | BODY MASS INDEX: 37.31 KG/M2 | WEIGHT: 232.13 LBS

## 2024-11-08 DIAGNOSIS — N95.1 PERIMENOPAUSAL SYMPTOMS: Primary | ICD-10-CM

## 2024-11-08 DIAGNOSIS — R00.2 PALPITATIONS: Primary | ICD-10-CM

## 2024-11-08 PROCEDURE — 3008F BODY MASS INDEX DOCD: CPT | Mod: CPTII,S$GLB,,

## 2024-11-08 PROCEDURE — 3079F DIAST BP 80-89 MM HG: CPT | Mod: CPTII,S$GLB,,

## 2024-11-08 PROCEDURE — 99213 OFFICE O/P EST LOW 20 MIN: CPT | Mod: S$GLB,,,

## 2024-11-08 PROCEDURE — 81025 URINE PREGNANCY TEST: CPT | Mod: S$GLB,,,

## 2024-11-08 PROCEDURE — 3075F SYST BP GE 130 - 139MM HG: CPT | Mod: CPTII,S$GLB,,

## 2024-11-08 PROCEDURE — 99999 PR PBB SHADOW E&M-EST. PATIENT-LVL III: CPT | Mod: PBBFAC,,,

## 2024-11-08 PROCEDURE — 3044F HG A1C LEVEL LT 7.0%: CPT | Mod: CPTII,S$GLB,,

## 2024-11-08 RX ORDER — NORETHINDRONE ACETATE AND ETHINYL ESTRADIOL .02; 1 MG/1; MG/1
1 TABLET ORAL DAILY
Qty: 90 TABLET | Refills: 3 | Status: SHIPPED | OUTPATIENT
Start: 2024-11-08 | End: 2025-11-08

## 2024-11-08 NOTE — PROGRESS NOTES
Subjective:       Patient ID: Selena Spring is a 44 y.o. female.    Chief Complaint:  hormone      History of Present Illness  HPI    Patient complains of menstrual symptoms. Symptoms began several months ago. Patient describes symptoms of depression (severe), insomnia (moderate), and labile mood (severe). Symptoms occur with periods, which are irregular: 30 to 60 days apart. Patient denies bloating/fluid retention, breast tenderness, menorrhagia, menstrual cramping, and pelvic pain. Evaluation to date includes: none. Treatment to date includes: nothing. The patient is not currently sexually active. History of dyspareunia No       GYN & OB History  Patient's last menstrual period was 10/30/2024 (exact date).   Date of Last Pap: 8/14/2022    OB History   No obstetric history on file.       Review of Systems  Review of Systems   Constitutional: Negative.    HENT: Negative.     Eyes: Negative.    Respiratory: Negative.     Cardiovascular: Negative.    Gastrointestinal: Negative.    Genitourinary:  Positive for hot flashes and menstrual problem.   Musculoskeletal: Negative.    Integumentary:  Negative.   Neurological: Negative.    Hematological: Negative.    Psychiatric/Behavioral:  Positive for depression. The patient is nervous/anxious.    All other systems reviewed and are negative.  Breast: negative.            Objective:      Physical Exam:   Constitutional: She is oriented to person, place, and time. She appears well-developed and well-nourished.    HENT:   Head: Normocephalic and atraumatic.    Eyes: Pupils are equal, round, and reactive to light. Conjunctivae and EOM are normal.     Cardiovascular:  Normal rate, regular rhythm and normal heart sounds.             Pulmonary/Chest: Effort normal.        Abdominal: Soft. There is no abdominal tenderness.     Genitourinary:    Genitourinary Comments: deferred             Musculoskeletal: Normal range of motion and moves all extremeties.       Neurological:  She is alert and oriented to person, place, and time.    Skin: Skin is warm and dry. She is not diaphoretic.    Psychiatric: She has a normal mood and affect. Her behavior is normal. Judgment and thought content normal.             Assessment:        1. Perimenopausal symptoms               Plan:   Continue annual well woman exam.  Perimenopause discussed with pt including menstrual irregularity and hormonal fluctuations prior to clinical menopause. It is characterized by irregular menstrual cycles and marked hormonal fluctuations, often accompanied by hot flashes, sleep disturbances, mood symptoms, and vaginal dryness   Patient excepts trial of OCP for management of perimenopausal symptoms     Diagnosis and orders this visit:  Perimenopausal symptoms  -     norethindrone-ethinyl estradiol (MICROGESTIN 1/20) 1-20 mg-mcg per tablet; Take 1 tablet by mouth once daily. Take continuously, avoiding menses  Dispense: 90 tablet; Refill: 3  -     POCT urine pregnancy      Cynthia Fregoso NP

## 2024-11-11 ENCOUNTER — HOSPITAL ENCOUNTER (OUTPATIENT)
Dept: CARDIOLOGY | Facility: HOSPITAL | Age: 44
Discharge: HOME | End: 2024-11-11
Attending: INTERNAL MEDICINE
Payer: COMMERCIAL

## 2024-11-11 DIAGNOSIS — R00.2 PALPITATIONS: ICD-10-CM

## 2024-11-11 DIAGNOSIS — R00.2 PALPITATIONS: Primary | ICD-10-CM

## 2024-11-11 LAB
OHS QRS DURATION: 84 MS
OHS QTC CALCULATION: 425 MS

## 2024-11-11 PROCEDURE — 93010 ELECTROCARDIOGRAM REPORT: CPT | Mod: ,,, | Performed by: INTERNAL MEDICINE

## 2024-11-11 PROCEDURE — 93005 ELECTROCARDIOGRAM TRACING: CPT

## 2024-11-22 ENCOUNTER — PATIENT MESSAGE (OUTPATIENT)
Dept: OBSTETRICS AND GYNECOLOGY | Facility: CLINIC | Age: 44
End: 2024-11-22
Payer: COMMERCIAL

## 2024-11-22 DIAGNOSIS — N95.1 PERIMENOPAUSAL SYMPTOMS: ICD-10-CM

## 2024-11-22 RX ORDER — NORETHINDRONE ACETATE AND ETHINYL ESTRADIOL .02; 1 MG/1; MG/1
1 TABLET ORAL DAILY
Qty: 90 TABLET | Refills: 3 | Status: SHIPPED | OUTPATIENT
Start: 2024-11-22 | End: 2025-11-22

## 2024-11-29 ENCOUNTER — OFFICE VISIT (OUTPATIENT)
Dept: CARDIOLOGY | Facility: CLINIC | Age: 44
End: 2024-11-29
Payer: COMMERCIAL

## 2024-11-29 VITALS
BODY MASS INDEX: 37.6 KG/M2 | WEIGHT: 233.94 LBS | HEIGHT: 66 IN | HEART RATE: 86 BPM | DIASTOLIC BLOOD PRESSURE: 94 MMHG | SYSTOLIC BLOOD PRESSURE: 152 MMHG

## 2024-11-29 DIAGNOSIS — I10 PRIMARY HYPERTENSION: Primary | ICD-10-CM

## 2024-11-29 PROCEDURE — 99999 PR PBB SHADOW E&M-EST. PATIENT-LVL III: CPT | Mod: PBBFAC,,, | Performed by: INTERNAL MEDICINE

## 2024-11-29 RX ORDER — LOSARTAN POTASSIUM 50 MG/1
50 TABLET ORAL DAILY
Qty: 90 TABLET | Refills: 3 | Status: SHIPPED | OUTPATIENT
Start: 2024-11-29 | End: 2025-11-29

## 2024-11-29 NOTE — PROGRESS NOTES
Subjective:   Patient ID:  Selena Spring is a 44 y.o. female who presents for evaluation of No chief complaint on file.      43 yo female, referred for HTN   PMH PTSD, anxiety menopause on OTC rx. No h/o MI DM CVA.  EBR. No smoking and drinking. No f/h preamture CAD  Dx of HTN this yr.   EKG reviewed by myself today reveals NSR nonspecific STT change  BP high  LDL and A1c C  Prazosin rx for PTSD        No results found for this or any previous visit.     No results found for this or any previous visit.       History reviewed. No pertinent past medical history.    Past Surgical History:   Procedure Laterality Date    INJECTION OF JOINT Right 3/10/2021    Procedure: Right IA Hip Joint injection - per orthopedics [facility];  Surgeon: Renny Vargas MD;  Location: [facility];  Service: Pain Management;  Laterality: Right;       Social History     Tobacco Use    Smoking status: Former     Current packs/day: 0.00     Types: Cigarettes     Quit date:      Years since quittin.9    Smokeless tobacco: Never   Substance Use Topics    Alcohol use: Never    Drug use: Never       Family History   Problem Relation Name Age of Onset    Breast cancer Mother      Lung cancer Mother      Stroke Mother      Clotting disorder Mother      Prostate cancer Father      Esophageal cancer Father      Clotting disorder Father      Heart disease Paternal Grandmother      Dementia Paternal Grandfather      Breast cancer Paternal Aunt         Review of Systems   Constitutional: Negative for decreased appetite, diaphoresis, fever, malaise/fatigue and night sweats.   HENT:  Negative for nosebleeds.    Eyes:  Negative for blurred vision and double vision.   Cardiovascular:  Negative for chest pain, claudication, dyspnea on exertion, irregular heartbeat, leg swelling, near-syncope, orthopnea, palpitations, paroxysmal nocturnal dyspnea and syncope.   Respiratory:  Negative for cough, shortness of breath, sleep disturbances due to  breathing, snoring, sputum production and wheezing.    Endocrine: Negative for cold intolerance and polyuria.   Hematologic/Lymphatic: Does not bruise/bleed easily.   Skin:  Negative for rash.   Musculoskeletal:  Negative for back pain, falls, joint pain, joint swelling and neck pain.   Gastrointestinal:  Negative for abdominal pain, heartburn, nausea and vomiting.   Genitourinary:  Negative for dysuria, frequency and hematuria.   Neurological:  Negative for difficulty with concentration, dizziness, focal weakness, headaches, light-headedness, numbness, seizures and weakness.   Psychiatric/Behavioral:  Negative for depression, memory loss and substance abuse. The patient is nervous/anxious. The patient does not have insomnia.    Allergic/Immunologic: Negative for HIV exposure and hives.       Objective:   Physical Exam  HENT:      Head: Normocephalic.   Eyes:      Pupils: Pupils are equal, round, and reactive to light.   Neck:      Thyroid: No thyromegaly.      Vascular: Normal carotid pulses. No carotid bruit or JVD.   Cardiovascular:      Rate and Rhythm: Normal rate and regular rhythm. No extrasystoles are present.     Chest Wall: PMI is not displaced.      Pulses: Normal pulses.      Heart sounds: Normal heart sounds. No murmur heard.     No gallop. No S3 sounds.   Pulmonary:      Effort: No respiratory distress.      Breath sounds: Normal breath sounds. No stridor.   Abdominal:      General: Bowel sounds are normal.      Palpations: Abdomen is soft.      Tenderness: There is no abdominal tenderness. There is no rebound.   Musculoskeletal:         General: Normal range of motion.   Skin:     Findings: No rash.   Neurological:      Mental Status: She is alert and oriented to person, place, and time.   Psychiatric:         Behavior: Behavior normal.         Lab Results   Component Value Date    CHOL 189 10/15/2024     Lab Results   Component Value Date    HDL 60 10/15/2024     Lab Results   Component Value Date     "LDLCALC 110 (H) 10/15/2024     Lab Results   Component Value Date    TRIG 95 10/15/2024     No results found for: "CHOLHDL"    Chemistry    No results found for: "NA", "K", "CL", "CO2", "BUN", "CREATININE", "GLU" No results found for: "CALCIUM", "ALKPHOS", "AST", "ALT", "BILITOT", "ESTGFRAFRICA", "EGFRNONAA"       Lab Results   Component Value Date    HGBA1C 5.2 11/07/2024     Lab Results   Component Value Date    TSH 1.153 11/04/2024     No results found for: "INR", "PROTIME"  Lab Results   Component Value Date    WBC 11.62 03/13/2024    HGB Negative 10/28/2024    HCT 45.1 03/13/2024    MCV 90 03/13/2024     03/13/2024     BNP  @LABRCNTIP(BNP,BNPTRIAGEBLO)@  CrCl cannot be calculated (No successful lab value found.).  No results found in the last 24 hours.  No results found in the last 24 hours.  No results found in the last 24 hours.    Assessment:      1. Primary hypertension        Plan:   Primary hypertension  -     Echo; Future    Other orders  -     losartan (COZAAR) 50 MG tablet; Take 1 tablet (50 mg total) by mouth once daily.  Dispense: 90 tablet; Refill: 3      Echo ordered  Add Losartan 50 mg daily  DASH            "

## 2024-12-04 ENCOUNTER — HOSPITAL ENCOUNTER (OUTPATIENT)
Dept: CARDIOLOGY | Facility: HOSPITAL | Age: 44
Discharge: HOME | End: 2024-12-04
Attending: INTERNAL MEDICINE
Payer: COMMERCIAL

## 2024-12-04 VITALS
HEIGHT: 66 IN | SYSTOLIC BLOOD PRESSURE: 152 MMHG | DIASTOLIC BLOOD PRESSURE: 94 MMHG | WEIGHT: 233 LBS | BODY MASS INDEX: 37.45 KG/M2 | HEART RATE: 75 BPM

## 2024-12-04 DIAGNOSIS — I10 PRIMARY HYPERTENSION: ICD-10-CM

## 2024-12-04 PROCEDURE — 93306 TTE W/DOPPLER COMPLETE: CPT | Mod: 26,,, | Performed by: STUDENT IN AN ORGANIZED HEALTH CARE EDUCATION/TRAINING PROGRAM

## 2024-12-04 PROCEDURE — 93306 TTE W/DOPPLER COMPLETE: CPT

## 2024-12-05 LAB
AORTIC ROOT ANNULUS: 2.38 CM
ASCENDING AORTA: 2.71 CM
AV INDEX (PROSTH): 0.8
AV MEAN GRADIENT: 4.6 MMHG
AV PEAK GRADIENT: 7.8 MMHG
AV VALVE AREA BY VELOCITY RATIO: 2.2 CM²
AV VALVE AREA: 2.5 CM²
AV VELOCITY RATIO: 0.71
BSA FOR ECHO PROCEDURE: 2.22 M2
CV ECHO LV RWT: 0.53 CM
DOP CALC AO PEAK VEL: 1.4 M/S
DOP CALC AO VTI: 30.3 CM
DOP CALC LVOT AREA: 3.1 CM2
DOP CALC LVOT DIAMETER: 2 CM
DOP CALC LVOT PEAK VEL: 1 M/S
DOP CALC LVOT STROKE VOLUME: 76 CM3
DOP CALC RVOT PEAK VEL: 0.9 M/S
DOP CALC RVOT VTI: 20.8 CM
DOP CALCLVOT PEAK VEL VTI: 24.2 CM
E WAVE DECELERATION TIME: 160.74 MSEC
E/A RATIO: 1.48
E/E' RATIO: 7.08 M/S
ECHO LV POSTERIOR WALL: 1.2 CM (ref 0.6–1.1)
EJECTION FRACTION: 65 %
FRACTIONAL SHORTENING: 37.8 % (ref 28–44)
INTERVENTRICULAR SEPTUM: 1 CM (ref 0.6–1.1)
IVRT: 97.05 MSEC
LA MAJOR: 4.87 CM
LA MINOR: 4.2 CM
LA WIDTH: 3.4 CM
LEFT ATRIUM AREA SYSTOLIC (APICAL 2 CHAMBER): 15.97 CM2
LEFT ATRIUM AREA SYSTOLIC (APICAL 4 CHAMBER): 15.48 CM2
LEFT ATRIUM SIZE: 3.75 CM
LEFT ATRIUM VOLUME INDEX MOD: 19.8 ML/M2
LEFT ATRIUM VOLUME INDEX: 22.9 ML/M2
LEFT ATRIUM VOLUME MOD: 42.21 ML
LEFT ATRIUM VOLUME: 48.88 CM3
LEFT INTERNAL DIMENSION IN SYSTOLE: 2.8 CM (ref 2.1–4)
LEFT VENTRICLE DIASTOLIC VOLUME INDEX: 42.32 ML/M2
LEFT VENTRICLE DIASTOLIC VOLUME: 90.14 ML
LEFT VENTRICLE END SYSTOLIC VOLUME APICAL 2 CHAMBER: 43.26 ML
LEFT VENTRICLE END SYSTOLIC VOLUME APICAL 4 CHAMBER: 37.43 ML
LEFT VENTRICLE MASS INDEX: 82.2 G/M2
LEFT VENTRICLE SYSTOLIC VOLUME INDEX: 13.5 ML/M2
LEFT VENTRICLE SYSTOLIC VOLUME: 28.72 ML
LEFT VENTRICULAR INTERNAL DIMENSION IN DIASTOLE: 4.5 CM (ref 3.5–6)
LEFT VENTRICULAR MASS: 175 G
LV LATERAL E/E' RATIO: 6.13 M/S
LV SEPTAL E/E' RATIO: 8.36 M/S
LVED V (TEICH): 90.14 ML
LVES V (TEICH): 28.72 ML
LVOT MG: 2.58 MMHG
LVOT MV: 0.77 CM/S
MV PEAK A VEL: 0.62 M/S
MV PEAK E VEL: 0.92 M/S
MV STENOSIS PRESSURE HALF TIME: 46.61 MS
MV VALVE AREA P 1/2 METHOD: 4.72 CM2
PISA TR MAX VEL: 2.71 M/S
PULM VEIN S/D RATIO: 1.38
PV MEAN GRADIENT: 2 MMHG
PV PEAK D VEL: 0.39 M/S
PV PEAK GRADIENT: 5 MMHG
PV PEAK S VEL: 0.54 M/S
PV PEAK VELOCITY: 1.08 M/S
RA MAJOR: 4.45 CM
RA PRESSURE ESTIMATED: 3 MMHG
RA WIDTH: 3.02 CM
RIGHT VENTRICULAR END-DIASTOLIC DIMENSION: 3.25 CM
RV TB RVSP: 6 MMHG
SINUS: 2.36 CM
STJ: 2.12 CM
TDI LATERAL: 0.15 M/S
TDI SEPTAL: 0.11 M/S
TDI: 0.13 M/S
TR MAX PG: 29 MMHG
TV REST PULMONARY ARTERY PRESSURE: 32 MMHG
Z-SCORE OF LEFT VENTRICULAR DIMENSION IN END DIASTOLE: -4.11
Z-SCORE OF LEFT VENTRICULAR DIMENSION IN END SYSTOLE: -3.09

## 2024-12-06 ENCOUNTER — PATIENT MESSAGE (OUTPATIENT)
Dept: ALLERGY | Facility: CLINIC | Age: 44
End: 2024-12-06

## 2024-12-09 ENCOUNTER — TELEPHONE (OUTPATIENT)
Dept: CARDIOLOGY | Facility: CLINIC | Age: 44
End: 2024-12-09
Payer: COMMERCIAL

## 2024-12-09 NOTE — TELEPHONE ENCOUNTER
LVM for pt to call back in regards to test results.                ----- Message from Jose L South MD sent at 12/9/2024  1:58 PM CST -----  The Echo showed normal function and mild valvular leaking.  Continue current Rx.   F/U in 6 m

## 2024-12-10 ENCOUNTER — TELEPHONE (OUTPATIENT)
Dept: CARDIOLOGY | Facility: CLINIC | Age: 44
End: 2024-12-10
Payer: COMMERCIAL

## 2024-12-10 NOTE — TELEPHONE ENCOUNTER
Spoke with pt in regards to test results. Pt verbalized understanding information without any concerns.                 ----- Message from Lylette sent at 12/10/2024 11:41 AM CST -----  Regarding: Results  Contact: [phone]  Missed Callback         Pt returning callback from missed call. Requesting to speak with somebody in   office. Please call. [phone]

## 2024-12-23 ENCOUNTER — PATIENT MESSAGE (OUTPATIENT)
Dept: ALLERGY | Facility: CLINIC | Age: 44
End: 2024-12-23
Payer: COMMERCIAL

## 2025-01-24 ENCOUNTER — PATIENT MESSAGE (OUTPATIENT)
Dept: SPORTS MEDICINE | Facility: CLINIC | Age: 45
End: 2025-01-24
Payer: COMMERCIAL

## 2025-01-24 DIAGNOSIS — M25.511 RIGHT SHOULDER PAIN, UNSPECIFIED CHRONICITY: ICD-10-CM

## 2025-01-24 DIAGNOSIS — M75.41 ROTATOR CUFF IMPINGEMENT SYNDROME OF RIGHT SHOULDER: Primary | ICD-10-CM

## 2025-01-25 ENCOUNTER — CLINICAL SUPPORT (OUTPATIENT)
Facility: HOSPITAL | Age: 45
End: 2025-01-25
Attending: ORTHOPAEDIC SURGERY
Payer: COMMERCIAL

## 2025-01-25 DIAGNOSIS — M25.511 RIGHT SHOULDER PAIN, UNSPECIFIED CHRONICITY: ICD-10-CM

## 2025-01-25 DIAGNOSIS — M75.41 ROTATOR CUFF IMPINGEMENT SYNDROME OF RIGHT SHOULDER: ICD-10-CM

## 2025-01-25 DIAGNOSIS — R29.898 WEAKNESS OF RIGHT SHOULDER: ICD-10-CM

## 2025-01-25 DIAGNOSIS — M25.611 DECREASED ROM OF RIGHT SHOULDER: Primary | ICD-10-CM

## 2025-01-25 PROBLEM — G89.29 CHRONIC PAIN OF BOTH SHOULDERS: Status: RESOLVED | Noted: 2024-09-09 | Resolved: 2025-01-25

## 2025-01-25 PROBLEM — M62.511 MUSCLE WASTING AND ATROPHY, NOT ELSEWHERE CLASSIFIED, RIGHT SHOULDER: Status: RESOLVED | Noted: 2024-09-09 | Resolved: 2025-01-25

## 2025-01-25 PROBLEM — M25.512 CHRONIC PAIN OF BOTH SHOULDERS: Status: RESOLVED | Noted: 2024-09-09 | Resolved: 2025-01-25

## 2025-01-25 PROCEDURE — 97140 MANUAL THERAPY 1/> REGIONS: CPT | Mod: PN | Performed by: PHYSICAL THERAPIST

## 2025-01-25 PROCEDURE — 97161 PT EVAL LOW COMPLEX 20 MIN: CPT | Mod: PN | Performed by: PHYSICAL THERAPIST

## 2025-01-25 PROCEDURE — 97112 NEUROMUSCULAR REEDUCATION: CPT | Mod: PN | Performed by: PHYSICAL THERAPIST

## 2025-01-25 NOTE — PROGRESS NOTES
Outpatient Rehab    Physical Therapy Evaluation    Patient Name: Selena Spring  MRN: [id number]  YOB: 1980  Today's Date: 1/26/2025    Therapy Diagnosis:   Encounter Diagnoses   Name Primary?    Rotator cuff impingement syndrome of right shoulder     Right shoulder pain, unspecified chronicity     Decreased ROM of right shoulder Yes    Weakness of right shoulder      Physician: Cachorro Ramirez MD    Physician Orders: Eval and Treat  Medical Diagnosis: Rotator cuff impingement syndrome of right shoulder [M75.41], Right shoulder pain, unspecified chronicity [M25.511]     Visit # / Visits Authorized:  1 / 1   Date of Evaluation:  1/25/2025   Insurance Authorization Period: 1/24/25 to 1/24/26  Plan of Care Certification:  1/25/2025 to 3/25/25      Time In: 1000   Time Out: 1100  Total Time: 60   Total Billable Time: 60         Subjective   History of Present Illness  Selena is a 44 y.o. female who reports to physical therapy with a chief concern of Right > Left shoulder pain. According to the patient's chart, [email or web address] has a past surgical history that includes Injection of joint (Right, 3/10/2021).    The patient reports a medical diagnosis of Rotator cuff impingement syndrome of right shoulder (M75.41), Right shoulder pain, unspecified chronicity (M25.511).            History of Present Condition/Illness: Patient reports right greater than left shoulder pain that is primarily around her lateral scapular border but sometimes extends out to her shoulder. She reports she had been doing physical therapy last year but had to stop due to other circumstances but felt that it was helping while she was going.    Pain     Patient reports a current pain level of 2/10. Pain at best is reported as 0/10. Pain at worst is reported as 8/10.   Location: R > L lateral scapular border  Clinical Progression (since onset): Stable  Pain Qualities: Aching  Pain-Relieving Factors: Physical  therapy  Pain-Aggravating Factors: Computer work, Other (Comment)  Other Pain-Aggravating Factors: pronlonged sitting         Employment  Employment Status: Employed full-time    in [place]      Past Medical History/Physical Systems Review:   Selena Spring  has no past medical history on file.    Selena Spring  has a past surgical history that includes Injection of joint (Right, 3/10/2021).    Selena has a current medication list which includes the following prescription(s): azelastine, butalbital-acetaminophen-caffeine -40 mg, clindamycin-benzoyl peroxide, desloratadine, auvelity, fluticasone propionate, gabapentin, hydroxyzine pamoate, lamotrigine, losartan, montelukast, norethindrone-ethinyl estradiol, omeprazole, prazosin, sertraline, tizanidine, tramadol, triamcinolone acetonide 0.1%, and urea.    Review of patient's allergies indicates:   Allergen Reactions    Clonazepam Other (See Comments)     Suicidal thoughts    Sulfa (sulfonamide antibiotics) Hives        Objective   Posture  Patient presents with a Forward head position.     Shoulders are Rounded.             Upper Extremity Sensation  General Upper Extremity Sensation  Intact: Right  Right Upper Extremity Sensation  Intact: Light Touch, Sharp/Dull Discrimination, Kinesthesia, Proprioception, and Hot/Cold Discrimination  Right Upper Extremity Sensation Stocking Glove Pattern: No                  Shoulder Palpation     Mild tenderness to palpation along posterior deltoid and superolateral scapular border       Mild tenderness to palpation along posterior deltoid and superolateral scapular border        Shoulder Range of Motion     Shoulder, Elbow, or Forearm Range of Motion Details: Good overall shoulder range of motion with pain at endROM flexion and endROM horizontal adduction bilaterally         Shoulder Strength - Planes of Motion   Right Strength Right Pain Left Strength Left  Pain   Flexion 4 Yes 4+     Extension 4 Yes 4+      ABduction 4+   4+     ADduction           Horizontal ABduction           Horizontal ADduction           Internal Rotation 0°           Internal Rotation 90°           External Rotation 0°           External Rotation 90°               Shoulder Strength - Rotator Cuff Muscles   Right Strength Right Pain Left Strength Left  Pain   Supraspinatus 4   4     Infraspinatus 5   5     Teres Minor 4   4     Subscapularis 5   5       Shoulder Strength Details  Dynamometry: R ER = 17.3 ; L ER = 18.6                 Intake Outcome Measure for FOTO Survey    Therapist reviewed FOTO scores for Selena Spring on 1/25/2025.   FOTO report - see Media section or FOTO account episode details.     Intake Score: 60%    Treatment:  Intervention Code  (see below chart) Date/Notes  1/25/25   Standing B ER Iso Holds NR 3 x 20 second; blue band   Cable Shoulder Extensions NR Single arm ; 5# ; 3x10   Cable Serratus Punch NR Single Arm ; 5# ; 3x10   Manual Therapy MT Supine Thoracic Manipulation                           0 minutes of Therapeutic Exercise (TE) to develop strength, endurance, ROM, and flexibility. (47628)  10 minutes of Manual therapy (MT) to improve pain and ROM. (68408)  15 minutes of Neuromuscular Re-Education (NR)  to improve: Balance, Coordination, Kinesthetic, Sense, Proprioception, and Posture. (18466)  0 minutes of Therapeutic Activities (TA) to improve functional performance. (88254)  Unattended Electrical Stimulation (ES) for muscle performance and/or pain modulation. (93696)  Vasopneumatic Device Therapy () for management of swelling/edema. (88616)  BFR: Blood flow restriction applied during exercise  NP: Not Performed    Patient's spiritual, cultural, and educational needs considered and patient agreeable to plan of care and goals.     Assessment & Plan   Assessment  Selena presents with a condition of Low complexity.   Presentation of Symptoms: Stable, Uncomplicated       Functional Limitations: Pain when  reaching  Impairments: Impaired physical strength, Pain with functional activity    Patient Goal for Therapy (PT): Be able to return to gym and daily activity without shoulder pain.  Prognosis: Good  Assessment Details: Patient with signs and symptoms consistent with soft tissue irritability of the B serratus, B lat, and B posterior deltoid.    Plan  From a physical therapy perspective, the patient would benefit from: Skilled Rehab Services    Planned therapy interventions include: Therapeutic exercise, Therapeutic activities, Neuromuscular re-education, Manual therapy, and ADLs/IADLs.    Planned modalities to include: Electrical stimulation - passive/unattended and Vasopneumatic pump.        Visit Frequency: 2 times Per Week for 6 Weeks.       This plan was discussed with Patient.   Discussion participants: Agreed Upon Plan of Care             Goals:   Short Term Goals:  4 weeks Status  Date Met   PAIN: Pt will report worst pain of 4/10 in order to progress toward max functional ability and improve quality of life. [x] Progressing  [] Met  [] Not Met    FUNCTION: Patient will demonstrate improved function as indicated by a functional score of greater than or equal to 70 out of 100 on FOTO. [x] Progressing  [] Met  [] Not Met    MOBILITY: Patient will improve AROM to 50% of stated goals, listed in objective measures above, in order to progress towards independence with functional activities.  [x] Progressing  [] Met  [] Not Met    STRENGTH: Patient will improve strength to 50% of stated goals, listed in objective measures above, in order to progress towards independence with functional activities. [x] Progressing  [] Met  [] Not Met    HEP: Patient will demonstrate independence with HEP in order to progress toward functional independence. [x] Progressing  [] Met  [] Not Met      Long Term Goals:  6 weeks Status Date Met   PAIN: Pt will report worst pain of 2/10 in order to progress toward max functional ability and  improve quality of life [x] Progressing  [] Met  [] Not Met    FUNCTION: Patient will demonstrate improved function as indicated by a functional score of greater than or equal to 75 out of 100 on FOTO. [x] Progressing  [] Met  [] Not Met    MOBILITY: Patient will improve AROM to stated goals, listed in objective measures above, in order to return to maximal functional potential and improve quality of life. Full, pain-free shoulder range of motion bilaterally [x] Progressing  [] Met  [] Not Met    STRENGTH: Patient will improve strength to stated goals, listed in objective measures above, in order to improve functional independence and quality of life. Goal: 5/5 and pain-free for all shoulder measures bilaterally [x] Progressing  [] Met  [] Not Met    Patient will return to gym activity with less than or equal to 2/10 pain and maximal function.  [x] Progressing  [] Met  [] Not Met        Higinio Hernandez, PT, DPT  Physical Therapist  Board-Certified Specialist in Orthopaedic and Sports Physical Therapy  1/26/2025

## 2025-01-26 PROBLEM — M25.611 DECREASED ROM OF RIGHT SHOULDER: Status: ACTIVE | Noted: 2025-01-26

## 2025-01-26 PROBLEM — R29.898 WEAKNESS OF RIGHT SHOULDER: Status: ACTIVE | Noted: 2025-01-26

## 2025-01-31 ENCOUNTER — CLINICAL SUPPORT (OUTPATIENT)
Facility: HOSPITAL | Age: 45
End: 2025-01-31
Payer: COMMERCIAL

## 2025-01-31 DIAGNOSIS — R29.898 WEAKNESS OF RIGHT SHOULDER: ICD-10-CM

## 2025-01-31 DIAGNOSIS — M25.611 DECREASED ROM OF RIGHT SHOULDER: Primary | ICD-10-CM

## 2025-01-31 PROCEDURE — 97110 THERAPEUTIC EXERCISES: CPT | Mod: PN | Performed by: PHYSICAL THERAPIST

## 2025-01-31 PROCEDURE — 97112 NEUROMUSCULAR REEDUCATION: CPT | Mod: PN | Performed by: PHYSICAL THERAPIST

## 2025-01-31 PROCEDURE — 97140 MANUAL THERAPY 1/> REGIONS: CPT | Mod: PN | Performed by: PHYSICAL THERAPIST

## 2025-01-31 NOTE — PROGRESS NOTES
[facility]   Physical Therapy Treatment Note     Name: Selena Spring  Clinic Number: [id number]    Therapy Diagnosis: No diagnosis found.  Physician: Cachorro Ramirez MD    Visit Date: 1/31/2025    Physician Orders: Eval and Treat  Medical Diagnosis: Rotator cuff impingement syndrome of right shoulder [M75.41], Right shoulder pain, unspecified chronicity [M25.511]      Visit # / Visits Authorized:  1 / 20 (1/1 eval)  Date of Evaluation:  1/25/2025   Insurance Authorization Period: 1/24/25 to 1/24/26  Plan of Care Certification:  1/25/2025 to 3/25/25     Time In: 10:00 AM  Time Out: 10:35 AM  Total Billable Time: 35 minutes    SUBJECTIVE     Pt reports: she has some soreness today after moving boxes at home. She reports she doesn't want to increase any of her exercise today..  She was compliant with home exercise program.  Response to previous treatment: no notable change  Functional change: no notable change    Pain: 5/10  Location: right upper trap and serratus    OBJECTIVE     Objective Measures updated at progress report unless specified.     Treatment     Selena received the treatments listed below:      Intervention Code  (see below chart) Date/Notes  1/31/25   ER isometric holds B TE 3x20s   Cable serratus Punch NR 5#; 3x10   Cable single arm row NR 5#; 3x10   Manual Therapy MT Thoracic manipulation     R shoulder posterior and inferior glides grades 3 and 4     R Upper trap STM     8 minutes of Therapeutic Exercise (TE) to develop strength, endurance, ROM, and flexibility. (09873)  12 minutes of Manual therapy (MT) to improve pain and ROM. (68934)  15 minutes of Neuromuscular Re-Education (NR)  to improve: Balance, Coordination, Kinesthetic, Sense, Proprioception, and Posture. (41478)  0 minutes of Therapeutic Activities (TA) to improve functional performance. (21511)  Unattended Electrical Stimulation (ES) for muscle performance and/or pain modulation.  (37559)  Vasopneumatic Device Therapy () for management of swelling/edema. (09602)  BFR: Blood flow restriction applied during exercise  NP: Not Performed    Patient Education and Home Exercises     Home Exercises Provided and Patient Education Provided     Education provided:   - HEP, PT POC    Written Home Exercises Provided: yes. Exercises were reviewed and Selena was able to demonstrate them prior to the end of the session.  Selena demonstrated good  understanding of the education provided. See EMR under Patient Instructions for exercises provided during therapy sessions    ASSESSMENT     Patient tolerated today's treatment well. Limited exercise progression per patient request today.     Selena Is progressing well towards her goals.   Pt prognosis is Excellent.     Pt will continue to benefit from skilled outpatient physical therapy to address the deficits listed in the problem list box on initial evaluation, provide pt/family education and to maximize pt's level of independence in the home and community environment.     Pt's spiritual, cultural and educational needs considered and pt agreeable to plan of care and goals.     Anticipated barriers to physical therapy: None    Goals:   Short Term Goals:  4 weeks Status  Date Met   PAIN: Pt will report worst pain of 4/10 in order to progress toward max functional ability and improve quality of life. [x] Progressing  [] Met  [] Not Met     FUNCTION: Patient will demonstrate improved function as indicated by a functional score of greater than or equal to 70 out of 100 on FOTO. [x] Progressing  [] Met  [] Not Met     MOBILITY: Patient will improve AROM to 50% of stated goals, listed in objective measures above, in order to progress towards independence with functional activities.  [x] Progressing  [] Met  [] Not Met     STRENGTH: Patient will improve strength to 50% of stated goals, listed in objective measures above, in order to progress towards independence with  functional activities. [x] Progressing  [] Met  [] Not Met     HEP: Patient will demonstrate independence with HEP in order to progress toward functional independence. [x] Progressing  [] Met  [] Not Met        Long Term Goals:  6 weeks Status Date Met   PAIN: Pt will report worst pain of 2/10 in order to progress toward max functional ability and improve quality of life [x] Progressing  [] Met  [] Not Met     FUNCTION: Patient will demonstrate improved function as indicated by a functional score of greater than or equal to 75 out of 100 on FOTO. [x] Progressing  [] Met  [] Not Met     MOBILITY: Patient will improve AROM to stated goals, listed in objective measures above, in order to return to maximal functional potential and improve quality of life. Full, pain-free shoulder range of motion bilaterally [x] Progressing  [] Met  [] Not Met     STRENGTH: Patient will improve strength to stated goals, listed in objective measures above, in order to improve functional independence and quality of life. Goal: 5/5 and pain-free for all shoulder measures bilaterally [x] Progressing  [] Met  [] Not Met     Patient will return to gym activity with less than or equal to 2/10 pain and maximal function.  [x] Progressing  [] Met  [] Not Met         PLAN   Continue per plan of care.  Progress as tolerated.    Higinio Hernandez, PT, DPT

## 2025-02-03 ENCOUNTER — CLINICAL SUPPORT (OUTPATIENT)
Facility: HOSPITAL | Age: 45
End: 2025-02-03
Payer: COMMERCIAL

## 2025-02-03 DIAGNOSIS — R29.898 WEAKNESS OF RIGHT SHOULDER: ICD-10-CM

## 2025-02-03 DIAGNOSIS — M25.611 DECREASED ROM OF RIGHT SHOULDER: Primary | ICD-10-CM

## 2025-02-03 PROCEDURE — 20560 NDL INSJ W/O NJX 1 OR 2 MUSC: CPT | Mod: PN,CG | Performed by: PHYSICAL THERAPIST

## 2025-02-03 PROCEDURE — 97112 NEUROMUSCULAR REEDUCATION: CPT | Mod: PN | Performed by: PHYSICAL THERAPIST

## 2025-02-03 PROCEDURE — 97140 MANUAL THERAPY 1/> REGIONS: CPT | Mod: PN | Performed by: PHYSICAL THERAPIST

## 2025-02-03 PROCEDURE — 97110 THERAPEUTIC EXERCISES: CPT | Mod: PN | Performed by: PHYSICAL THERAPIST

## 2025-02-03 NOTE — PROGRESS NOTES
[facility]   Physical Therapy Treatment Note     Name: Selena Spring  Clinic Number: [id number]    Therapy Diagnosis:   Encounter Diagnoses   Name Primary?    Decreased ROM of right shoulder Yes    Weakness of right shoulder      Physician: Cachorro Ramirez MD    Visit Date: 2/3/2025    Physician Orders: Eval and Treat  Medical Diagnosis: Rotator cuff impingement syndrome of right shoulder [M75.41], Right shoulder pain, unspecified chronicity [M25.511]      Visit # / Visits Authorized:  1 / 20 (1/1 eval)  Date of Evaluation:  1/25/2025   Insurance Authorization Period: 1/24/25 to 1/24/26  Plan of Care Certification:  1/25/2025 to 3/25/25     Time In: 1:30 PM  Time Out: 2:25 PM  Total Billable Time: 55 minutes    SUBJECTIVE     Pt reports: she has not noticed any change in her symptoms. She reports more R upper trap pain than previously.  She was compliant with home exercise program.  Response to previous treatment: no notable change  Functional change: no notable change    Pain: 5/10  Location: right upper trap and serratus    OBJECTIVE     Objective Measures updated at progress report unless specified.     Treatment     Selena received the treatments listed below:      Intervention Code  (see below chart) Date/Notes  2/3/25   Recumbent bike  TE 3 FW/3 BW   ER isometric holds B TE 3x20s   Cable serratus Punch B NR 5#; 1x10, 2x5   Cable single arm row NR 5#; 2x10   Prone row  NR 5# 2x10   Prone extension  NR 5# 1x10    Manual Therapy MT Thoracic manipulation     STM and Dry Needling R upper trap     8 minutes of Therapeutic Exercise (TE) to develop strength, endurance, ROM, and flexibility. (78171)  12 minutes of Manual therapy (MT) to improve pain and ROM. (11787)  25 minutes of Neuromuscular Re-Education (NR)  to improve: Balance, Coordination, Kinesthetic, Sense, Proprioception, and Posture. (70361)  0 minutes of Therapeutic Activities (TA) to improve functional performance.  (00962)  Unattended Electrical Stimulation (ES) for muscle performance and/or pain modulation. (94240)  Vasopneumatic Device Therapy () for management of swelling/edema. (63135)  BFR: Blood flow restriction applied during exercise  NP: Not Performed    Pt received Manual Therapy techniques in the form of Dry Needling. This was applied to the right Upper Trap. Dry needling was performed to decrease inflammation, increase circulation, decrease pain and restore homeostasis.      30 mm needles with 0.30 gauge were used. Electrical stimulation was not applied to the needles.    Patient gave written consent to undergo dry needling. Written consent can be found in the media section in pts chart. All needles were removed and changes in signs and symptoms were assessed. No adverse reactions noted at the conclusion of the treatment.       Patient Education and Home Exercises     Home Exercises Provided and Patient Education Provided     Education provided:   - HEP, PT POC    Written Home Exercises Provided: yes. Exercises were reviewed and Selena was able to demonstrate them prior to the end of the session.  Selena demonstrated good  understanding of the education provided. See EMR under Patient Instructions for exercises provided during therapy sessions    ASSESSMENT     Small exercise progression today and addition of dry needling for R upper trap pain. Continue to monitor symptom response.    Selena Is progressing well towards her goals.   Pt prognosis is Excellent.     Pt will continue to benefit from skilled outpatient physical therapy to address the deficits listed in the problem list box on initial evaluation, provide pt/family education and to maximize pt's level of independence in the home and community environment.     Pt's spiritual, cultural and educational needs considered and pt agreeable to plan of care and goals.     Anticipated barriers to physical therapy: None    Goals:   Short Term Goals:  4 weeks Status   Date Met   PAIN: Pt will report worst pain of 4/10 in order to progress toward max functional ability and improve quality of life. [x] Progressing  [] Met  [] Not Met     FUNCTION: Patient will demonstrate improved function as indicated by a functional score of greater than or equal to 70 out of 100 on FOTO. [x] Progressing  [] Met  [] Not Met     MOBILITY: Patient will improve AROM to 50% of stated goals, listed in objective measures above, in order to progress towards independence with functional activities.  [x] Progressing  [] Met  [] Not Met     STRENGTH: Patient will improve strength to 50% of stated goals, listed in objective measures above, in order to progress towards independence with functional activities. [x] Progressing  [] Met  [] Not Met     HEP: Patient will demonstrate independence with HEP in order to progress toward functional independence. [x] Progressing  [] Met  [] Not Met        Long Term Goals:  6 weeks Status Date Met   PAIN: Pt will report worst pain of 2/10 in order to progress toward max functional ability and improve quality of life [x] Progressing  [] Met  [] Not Met     FUNCTION: Patient will demonstrate improved function as indicated by a functional score of greater than or equal to 75 out of 100 on FOTO. [x] Progressing  [] Met  [] Not Met     MOBILITY: Patient will improve AROM to stated goals, listed in objective measures above, in order to return to maximal functional potential and improve quality of life. Full, pain-free shoulder range of motion bilaterally [x] Progressing  [] Met  [] Not Met     STRENGTH: Patient will improve strength to stated goals, listed in objective measures above, in order to improve functional independence and quality of life. Goal: 5/5 and pain-free for all shoulder measures bilaterally [x] Progressing  [] Met  [] Not Met     Patient will return to gym activity with less than or equal to 2/10 pain and maximal function.  [x] Progressing  [] Met  [] Not  Met         PLAN   Continue per plan of care.  Progress as tolerated.    Higinio Hernandez, PT, DPT

## 2025-02-05 ENCOUNTER — CLINICAL SUPPORT (OUTPATIENT)
Facility: HOSPITAL | Age: 45
End: 2025-02-05
Payer: COMMERCIAL

## 2025-02-05 DIAGNOSIS — R29.898 WEAKNESS OF RIGHT SHOULDER: ICD-10-CM

## 2025-02-05 DIAGNOSIS — M25.611 DECREASED ROM OF RIGHT SHOULDER: Primary | ICD-10-CM

## 2025-02-05 PROCEDURE — 97110 THERAPEUTIC EXERCISES: CPT | Mod: PN | Performed by: PHYSICAL THERAPIST

## 2025-02-05 PROCEDURE — 97112 NEUROMUSCULAR REEDUCATION: CPT | Mod: PN | Performed by: PHYSICAL THERAPIST

## 2025-02-05 PROCEDURE — 97140 MANUAL THERAPY 1/> REGIONS: CPT | Mod: PN | Performed by: PHYSICAL THERAPIST

## 2025-02-05 NOTE — PROGRESS NOTES
[facility]   Physical Therapy Treatment Note     Name: Selena Spring  Clinic Number: [id number]    Therapy Diagnosis:   Encounter Diagnoses   Name Primary?    Decreased ROM of right shoulder Yes    Weakness of right shoulder        Physician: Cachorro Ramirez MD    Visit Date: 2/5/2025    Physician Orders: Eval and Treat  Medical Diagnosis: Rotator cuff impingement syndrome of right shoulder [M75.41], Right shoulder pain, unspecified chronicity [M25.511]      Visit # / Visits Authorized:  1 / 20 (1/1 eval)  Date of Evaluation:  1/25/2025   Insurance Authorization Period: 1/24/25 to 1/24/26  Plan of Care Certification:  1/25/2025 to 3/25/25     Time In: 10:30 AM  Time Out: 11:20 AM  Total Billable Time: 50 minutes    SUBJECTIVE     Pt reports: she had some upper trap soreness after last visit but noticed some overall improvement.  She was compliant with home exercise program.  Response to previous treatment: no notable change  Functional change: no notable change    Pain: 5/10  Location: right upper trap and serratus    OBJECTIVE     Objective Measures updated at progress report unless specified.     Treatment     Selena received the treatments listed below:      Intervention Code  (see below chart) Date/Notes  2/5/25   UBE  TE 3 FW/3 BW   Manual Therapy MT Thoracic manipulation   Prone row NR 10# 2x10   Prone extension NR 5# 1x10, 2x5   ER isometric holds B TE 3x20s   Cable serratus Punch B NR 10#; 1x10, 2x5   Cable single arm row NR 5#; 2x10   Serratus Wall Slides NR 2x10 B; no band   Farmer Walk TA 20# kb; 3 x 25 yard laps; R - start at next visit     8 minutes of Therapeutic Exercise (TE) to develop strength, endurance, ROM, and flexibility. (19596)  12 minutes of Manual therapy (MT) to improve pain and ROM. (96693)  25 minutes of Neuromuscular Re-Education (NR)  to improve: Balance, Coordination, Kinesthetic, Sense, Proprioception, and Posture. (61748)  0 minutes of  Therapeutic Activities (TA) to improve functional performance. (84777)  Unattended Electrical Stimulation (ES) for muscle performance and/or pain modulation. (34549)  Vasopneumatic Device Therapy () for management of swelling/edema. (50932)  BFR: Blood flow restriction applied during exercise  NP: Not Performed           Patient Education and Home Exercises     Home Exercises Provided and Patient Education Provided     Education provided:   - HEP, PT POC    Written Home Exercises Provided: yes. Exercises were reviewed and Selena was able to demonstrate them prior to the end of the session.  Selena demonstrated good  understanding of the education provided. See EMR under Patient Instructions for exercises provided during therapy sessions    ASSESSMENT     Good progression to more strength work today. Continue to progress serratus and periscapular strength. She will benefit from continued care.    Selena Is progressing well towards her goals.   Pt prognosis is Excellent.     Pt will continue to benefit from skilled outpatient physical therapy to address the deficits listed in the problem list box on initial evaluation, provide pt/family education and to maximize pt's level of independence in the home and community environment.     Pt's spiritual, cultural and educational needs considered and pt agreeable to plan of care and goals.     Anticipated barriers to physical therapy: None    Goals:   Short Term Goals:  4 weeks Status  Date Met   PAIN: Pt will report worst pain of 4/10 in order to progress toward max functional ability and improve quality of life. [x] Progressing  [] Met  [] Not Met     FUNCTION: Patient will demonstrate improved function as indicated by a functional score of greater than or equal to 70 out of 100 on FOTO. [x] Progressing  [] Met  [] Not Met     MOBILITY: Patient will improve AROM to 50% of stated goals, listed in objective measures above, in order to progress towards independence with  functional activities.  [x] Progressing  [] Met  [] Not Met     STRENGTH: Patient will improve strength to 50% of stated goals, listed in objective measures above, in order to progress towards independence with functional activities. [x] Progressing  [] Met  [] Not Met     HEP: Patient will demonstrate independence with HEP in order to progress toward functional independence. [x] Progressing  [] Met  [] Not Met        Long Term Goals:  6 weeks Status Date Met   PAIN: Pt will report worst pain of 2/10 in order to progress toward max functional ability and improve quality of life [x] Progressing  [] Met  [] Not Met     FUNCTION: Patient will demonstrate improved function as indicated by a functional score of greater than or equal to 75 out of 100 on FOTO. [x] Progressing  [] Met  [] Not Met     MOBILITY: Patient will improve AROM to stated goals, listed in objective measures above, in order to return to maximal functional potential and improve quality of life. Full, pain-free shoulder range of motion bilaterally [x] Progressing  [] Met  [] Not Met     STRENGTH: Patient will improve strength to stated goals, listed in objective measures above, in order to improve functional independence and quality of life. Goal: 5/5 and pain-free for all shoulder measures bilaterally [x] Progressing  [] Met  [] Not Met     Patient will return to gym activity with less than or equal to 2/10 pain and maximal function.  [x] Progressing  [] Met  [] Not Met         PLAN   Continue per plan of care.  Progress as tolerated.    Higinio Hernandez, PT, DPT

## 2025-02-12 ENCOUNTER — PATIENT MESSAGE (OUTPATIENT)
Facility: HOSPITAL | Age: 45
End: 2025-02-12
Payer: COMMERCIAL

## 2025-02-13 ENCOUNTER — CLINICAL SUPPORT (OUTPATIENT)
Facility: HOSPITAL | Age: 45
End: 2025-02-13
Payer: COMMERCIAL

## 2025-02-13 DIAGNOSIS — R29.898 WEAKNESS OF RIGHT SHOULDER: ICD-10-CM

## 2025-02-13 DIAGNOSIS — M25.611 DECREASED ROM OF RIGHT SHOULDER: Primary | ICD-10-CM

## 2025-02-13 PROCEDURE — 97110 THERAPEUTIC EXERCISES: CPT | Mod: PN | Performed by: PHYSICAL THERAPIST

## 2025-02-13 PROCEDURE — 97112 NEUROMUSCULAR REEDUCATION: CPT | Mod: PN | Performed by: PHYSICAL THERAPIST

## 2025-02-13 PROCEDURE — 97530 THERAPEUTIC ACTIVITIES: CPT | Mod: PN | Performed by: PHYSICAL THERAPIST

## 2025-02-13 NOTE — PROGRESS NOTES
[facility]   Physical Therapy Treatment Note     Name: Selena Spring  Clinic Number: [id number]    Therapy Diagnosis:   Encounter Diagnoses   Name Primary?    Decreased ROM of right shoulder Yes    Weakness of right shoulder        Physician: Cachorro Ramirez MD    Visit Date: 2/13/2025    Physician Orders: Eval and Treat  Medical Diagnosis: Rotator cuff impingement syndrome of right shoulder [M75.41], Right shoulder pain, unspecified chronicity [M25.511]      Visit # / Visits Authorized:  4 / 20 (1/1 eval)  Date of Evaluation:  1/25/2025   Insurance Authorization Period: 1/24/25 to 1/24/26  Plan of Care Certification:  1/25/2025 to 3/25/25     Time In: 4:59 pm  Time Out: 5:57 pm  Total Billable Time: 58 minutes    SUBJECTIVE     Pt reports: She has had some shoulder pain after doing serratus wall slides at the house  She was compliant with home exercise program.  Response to previous treatment: Shoulder soreness   Functional change: no notable change    Pain: 5/10  Location: right upper trap and serratus    OBJECTIVE     Objective Measures updated at progress report unless specified.     Treatment     Selena received the treatments listed below:      Intervention  Code   (see below chart)  Performed   Today  Date/Notes   2/13/25    UBE   TE  X  3 FW/3 BW    Manual Therapy  MT    Thoracic manipulation    Prone row  NR  X  10# 2x10    Prone extension  NR  X  5# 3x10     ER isometric holds B  TE  X  3x20s    Dynamic Hugs  NR  X  Red, 3x10    Farmer Walk  TA  X  20# kb; 3 x 25 yard laps    TB ER @ 0  TE  X  Red 3x10 - jael    TB IR @ 0  TE  X  Green 3x10 - jael    Rows - seated  NMR  X   X  5# 3x10    Lat Pulldown - seated  NMR  X   X  2x10, 15#      15 minutes of Therapeutic Exercise (TE) to develop strength, endurance, ROM, and flexibility. (81243)   0 minutes of Manual therapy (MT) to improve pain and ROM. (16294)   30 minutes of Neuromuscular Re-Education (NR)  to improve: Balance,  Coordination, Kinesthetic, Sense, Proprioception, and Posture. (14898)   8 minutes of Therapeutic Activities (TA) to improve functional performance. (47851)      Patient Education and Home Exercises     Home Exercises Provided and Patient Education Provided     Education provided:   - HEP, PT POC    Written Home Exercises Provided: yes. Exercises were reviewed and Selena was able to demonstrate them prior to the end of the session.  Selena demonstrated good  understanding of the education provided. See EMR under Patient Instructions for exercises provided during therapy sessions    ASSESSMENT     Good progression to more strength work today. Continue to progress serratus and periscapular strength. She will benefit from continued care.    Selena Is progressing well towards her goals.   Pt prognosis is Excellent.     Pt will continue to benefit from skilled outpatient physical therapy to address the deficits listed in the problem list box on initial evaluation, provide pt/family education and to maximize pt's level of independence in the home and community environment.     Pt's spiritual, cultural and educational needs considered and pt agreeable to plan of care and goals.     Anticipated barriers to physical therapy: None    Goals:   Short Term Goals:  4 weeks Status  Date Met   PAIN: Pt will report worst pain of 4/10 in order to progress toward max functional ability and improve quality of life. [x] Progressing  [] Met  [] Not Met     FUNCTION: Patient will demonstrate improved function as indicated by a functional score of greater than or equal to 70 out of 100 on FOTO. [x] Progressing  [] Met  [] Not Met     MOBILITY: Patient will improve AROM to 50% of stated goals, listed in objective measures above, in order to progress towards independence with functional activities.  [x] Progressing  [] Met  [] Not Met     STRENGTH: Patient will improve strength to 50% of stated goals, listed in objective measures above, in  order to progress towards independence with functional activities. [x] Progressing  [] Met  [] Not Met     HEP: Patient will demonstrate independence with HEP in order to progress toward functional independence. [x] Progressing  [] Met  [] Not Met        Long Term Goals:  6 weeks Status Date Met   PAIN: Pt will report worst pain of 2/10 in order to progress toward max functional ability and improve quality of life [x] Progressing  [] Met  [] Not Met     FUNCTION: Patient will demonstrate improved function as indicated by a functional score of greater than or equal to 75 out of 100 on FOTO. [x] Progressing  [] Met  [] Not Met     MOBILITY: Patient will improve AROM to stated goals, listed in objective measures above, in order to return to maximal functional potential and improve quality of life. Full, pain-free shoulder range of motion bilaterally [x] Progressing  [] Met  [] Not Met     STRENGTH: Patient will improve strength to stated goals, listed in objective measures above, in order to improve functional independence and quality of life. Goal: 5/5 and pain-free for all shoulder measures bilaterally [x] Progressing  [] Met  [] Not Met     Patient will return to gym activity with less than or equal to 2/10 pain and maximal function.  [x] Progressing  [] Met  [] Not Met         PLAN   Continue per plan of care.  Progress as tolerated.    Ralph Potts, PT, DPT

## 2025-02-17 ENCOUNTER — CLINICAL SUPPORT (OUTPATIENT)
Facility: HOSPITAL | Age: 45
End: 2025-02-17
Payer: COMMERCIAL

## 2025-02-17 DIAGNOSIS — R29.898 WEAKNESS OF RIGHT SHOULDER: ICD-10-CM

## 2025-02-17 DIAGNOSIS — M25.611 DECREASED ROM OF RIGHT SHOULDER: Primary | ICD-10-CM

## 2025-02-17 PROCEDURE — 97110 THERAPEUTIC EXERCISES: CPT | Mod: PN | Performed by: PHYSICAL THERAPIST

## 2025-02-17 PROCEDURE — 20561 NDL INSJ W/O NJX 3+ MUSC: CPT | Mod: PN,CG | Performed by: PHYSICAL THERAPIST

## 2025-02-17 PROCEDURE — 97112 NEUROMUSCULAR REEDUCATION: CPT | Mod: PN | Performed by: PHYSICAL THERAPIST

## 2025-02-17 PROCEDURE — 97530 THERAPEUTIC ACTIVITIES: CPT | Mod: PN | Performed by: PHYSICAL THERAPIST

## 2025-02-17 NOTE — PROGRESS NOTES
[facility]   Physical Therapy Treatment Note     Name: Selena Spring  Clinic Number: [id number]    Therapy Diagnosis:   Encounter Diagnoses   Name Primary?    Decreased ROM of right shoulder Yes    Weakness of right shoulder        Physician: Cachorro Ramirez MD    Visit Date: 2/17/2025    Physician Orders: Eval and Treat  Medical Diagnosis: Rotator cuff impingement syndrome of right shoulder [M75.41], Right shoulder pain, unspecified chronicity [M25.511]      Visit # / Visits Authorized:  5/ 20 (1/1 eval)  Date of Evaluation:  1/25/2025   Insurance Authorization Period: 1/24/25 to 1/24/26  Plan of Care Certification:  1/25/2025 to 3/25/25     Time In: 10:23 am  Time Out: 11:45 am  Total Billable Time: 82 minutes    SUBJECTIVE     Pt reports: Tightness in rotator cuff following last visit and doing exercises over the weekend.  She was compliant with home exercise program.  Response to previous treatment: Shoulder soreness   Functional change: no notable change    Pain: 5/10  Location: right upper trap and serratus    OBJECTIVE     Objective Measures updated at progress report unless specified.     Treatment     Selena received the treatments listed below:      Intervention  Code   (see below chart)  Performed   Today  Date/Notes   2/17/25    UBE   TE  X  3 FW/3 BW    Thoracic Manipulation  MT        Dry Needling  DN  X  Infraspinatus, supraspinatus, deltoid, and latissimus dorsi    Prone row  NMR  X  10# 3x10    Prone extension  NMR  X  7# 3x10     Side lying ER  TE  X  5#, 2x10     ER isometric holds B  TE  X  3x20s    Dynamic Hugs  NMR  X  Red, 3x10    Farmer Walk  TA  X  20# kb; 3 x 25 yard laps    TB ER @ 0  TE  X  Red 3x10 - jael    TB IR @ 0  TE  X  Green 3x10 - jael    Rows - seated  NMR  X   X  10# 3x10    Lat Pulldown - seated  NMR  X   X  3x10, 20#      30 minutes of Therapeutic Exercise (TE) to develop strength, endurance, ROM, and flexibility. (73444)   0 minutes of  Manual therapy (MT) to improve pain and ROM. (93431)   30 minutes of Neuromuscular Re-Education (NR)  to improve: Balance, Coordination, Kinesthetic, Sense, Proprioception, and Posture. (16459)   8 minutes of Therapeutic Activities (TA) to improve functional performance. (99620)         Patient Education and Home Exercises     Home Exercises Provided and Patient Education Provided     Education provided:   - HEP, PT POC    Written Home Exercises Provided: yes. Exercises were reviewed and Selena was able to demonstrate them prior to the end of the session.  Selena demonstrated good  understanding of the education provided. See EMR under Patient Instructions for exercises provided during therapy sessions    ASSESSMENT   Pt reported improved painfree ROM and decreased pain with MMT after DN. Added side lying external rotation during TE to improve rotator cuff strength. Increased resistance with Lat pull downs during NMR to improve latissimus dorsi motor control. Increased resistance with prone extensions to improve periscapular motor control.    Selena Is progressing well towards her goals.   Pt prognosis is Excellent.     Pt will continue to benefit from skilled outpatient physical therapy to address the deficits listed in the problem list box on initial evaluation, provide pt/family education and to maximize pt's level of independence in the home and community environment.     Pt's spiritual, cultural and educational needs considered and pt agreeable to plan of care and goals.     Anticipated barriers to physical therapy: None    Goals:   Short Term Goals:  4 weeks Status  Date Met   PAIN: Pt will report worst pain of 4/10 in order to progress toward max functional ability and improve quality of life. [x] Progressing  [] Met  [] Not Met     FUNCTION: Patient will demonstrate improved function as indicated by a functional score of greater than or equal to 70 out of 100 on FOTO. [x] Progressing  [] Met  [] Not Met      MOBILITY: Patient will improve AROM to 50% of stated goals, listed in objective measures above, in order to progress towards independence with functional activities.  [x] Progressing  [] Met  [] Not Met     STRENGTH: Patient will improve strength to 50% of stated goals, listed in objective measures above, in order to progress towards independence with functional activities. [x] Progressing  [] Met  [] Not Met     HEP: Patient will demonstrate independence with HEP in order to progress toward functional independence. [x] Progressing  [] Met  [] Not Met        Long Term Goals:  6 weeks Status Date Met   PAIN: Pt will report worst pain of 2/10 in order to progress toward max functional ability and improve quality of life [x] Progressing  [] Met  [] Not Met     FUNCTION: Patient will demonstrate improved function as indicated by a functional score of greater than or equal to 75 out of 100 on FOTO. [x] Progressing  [] Met  [] Not Met     MOBILITY: Patient will improve AROM to stated goals, listed in objective measures above, in order to return to maximal functional potential and improve quality of life. Full, pain-free shoulder range of motion bilaterally [x] Progressing  [] Met  [] Not Met     STRENGTH: Patient will improve strength to stated goals, listed in objective measures above, in order to improve functional independence and quality of life. Goal: 5/5 and pain-free for all shoulder measures bilaterally [x] Progressing  [] Met  [] Not Met     Patient will return to gym activity with less than or equal to 2/10 pain and maximal function.  [x] Progressing  [] Met  [] Not Met         PLAN   Continue per plan of care.  Progress as tolerated.    Ralph Potts, PT, DPT

## 2025-02-18 ENCOUNTER — PATIENT MESSAGE (OUTPATIENT)
Dept: SPORTS MEDICINE | Facility: CLINIC | Age: 45
End: 2025-02-18
Payer: COMMERCIAL

## 2025-02-18 DIAGNOSIS — G89.29 CHRONIC PAIN OF BOTH SHOULDERS: Primary | ICD-10-CM

## 2025-02-18 DIAGNOSIS — M25.512 CHRONIC PAIN OF BOTH SHOULDERS: Primary | ICD-10-CM

## 2025-02-18 DIAGNOSIS — M25.511 CHRONIC PAIN OF BOTH SHOULDERS: Primary | ICD-10-CM

## 2025-02-19 ENCOUNTER — CLINICAL SUPPORT (OUTPATIENT)
Facility: HOSPITAL | Age: 45
End: 2025-02-19
Payer: COMMERCIAL

## 2025-02-19 DIAGNOSIS — R29.898 WEAKNESS OF RIGHT SHOULDER: ICD-10-CM

## 2025-02-19 DIAGNOSIS — G89.29 CHRONIC PAIN OF BOTH SHOULDERS: ICD-10-CM

## 2025-02-19 DIAGNOSIS — M25.511 CHRONIC PAIN OF BOTH SHOULDERS: ICD-10-CM

## 2025-02-19 DIAGNOSIS — M25.611 DECREASED ROM OF RIGHT SHOULDER: Primary | ICD-10-CM

## 2025-02-19 DIAGNOSIS — M25.512 CHRONIC PAIN OF BOTH SHOULDERS: ICD-10-CM

## 2025-02-19 PROCEDURE — 97112 NEUROMUSCULAR REEDUCATION: CPT | Mod: PN | Performed by: PHYSICAL THERAPIST

## 2025-02-19 PROCEDURE — 97110 THERAPEUTIC EXERCISES: CPT | Mod: PN | Performed by: PHYSICAL THERAPIST

## 2025-02-19 PROCEDURE — 20561 NDL INSJ W/O NJX 3+ MUSC: CPT | Mod: PN,CG | Performed by: PHYSICAL THERAPIST

## 2025-02-19 PROCEDURE — 97530 THERAPEUTIC ACTIVITIES: CPT | Mod: PN | Performed by: PHYSICAL THERAPIST

## 2025-02-19 NOTE — PROGRESS NOTES
Outpatient Rehab    Physical Therapy Progress Note : Updated Plan of Care    Patient Name: Selena Spring  MRN: [id number]  YOB: 1980  Today's Date: 2/19/2025    Therapy Diagnosis:   Encounter Diagnoses   Name Primary?    Chronic pain of both shoulders     Decreased ROM of right shoulder Yes    Weakness of right shoulder      Physician: Cachorro Ramirez MD    Physician Orders: Eval and Treat  Medical Diagnosis: Rotator cuff impingement syndrome of right shoulder [M75.41], Right shoulder pain, unspecified chronicity [M25.511]     Visit # / Visits Authorized:  6 / 12   Date of Evaluation:  1/25/2025   Insurance Authorization Period: 1/24/25 to 1/24/26  Plan of Care Certification:  2/19/2025 to 5/14/25     Time In:  10:30 am  Time Out: 11:52 am  Total Time:  82   Total Billable Time: 82 minutes    FOTO:  Intake Score: 60%  Survey Score 1: 58%  Survey Score 2:  %         Subjective      Pt reports: Continued soreness in left shoulder, some improvement in R shoulder pain  Response to previous treatment: Tolerated well, no pain or stiffness   Functional change: No notable changes    Objective          RANGE OF MOTION:  *denotes pain     Shoulder ROM  (Degrees) Right Left Goal   Shoulder Flexion 160 160 160   Shoulder Abduction  160 160* 160   Shoulder ER  90 80* 90   Shoulder IR  45 58 70         STRENGTH:  *denotes pain     MMT Right Left Goal   Infraspinatus  4/5 4-*/5 5/5   Subscapularis  4+/5 4+/5 5/5   Supraspinatus  4/5 4-*/5 5/5   Teres Minor 4-*/5 4/5 5/5   Rhomboid 4-/5 4-/5 5/5   Lower Trapezius  4-*/5 4-*/5 5/5   Latissimus Dorsi 4-/5 4/5 5/5         FUNCTION:     Intake Outcome Measure for FOTO Foto Survey    Therapist reviewed FOTO scores for Selena Spring on 2/19/2025.   FOTO report - see Media section or FOTO account episode details.    Intake Score: 60%  Updated Score: 58%         Treatment:  Intervention  Code   (see below chart)  Performed   Today  Date/Notes   2/19/25     Updated Goals, FOTO, and measurements   X    UBE   TE  X  3 FW/3 BW    Thoracic Manipulation  MT        Dry Needling  DN  X  L: Infraspinatus, supraspinatus, deltoid, and latissimus dorsi    Prone row  NMR  X  10# 3x10    Prone extension  NMR  X  7# 3x10     Side lying ER  TE  X  5#, 2x10     ER isometric holds B  TE  X  3x20s    Dynamic Hugs  NMR  X  Red, 3x10    Farmer Walk  TA  X  20# kb; 3 x 25 yard laps    TB ER @ 0  TE  X  Red 3x10 - jael    TB IR @ 0  TE  X  Blue 3x10 - jael    Rows - seated  NMR  X   X  10# 3x10    Lat Pulldown - seated  NMR  X   X  3x10, 15#    Cable Serratus Punch  TE  X  10#, 2x10      25 minutes of Therapeutic Exercise (TE) to develop strength, endurance, ROM, and flexibility. (01444)   0 minutes of Manual therapy (MT) to improve pain and ROM. (71317)   25 minutes of Neuromuscular Re-Education (NR)  to improve: Balance, Coordination, Kinesthetic, Sense, Proprioception, and Posture. (60913)   8 minutes of Therapeutic Activities (TA) to improve functional performance.    Assessment & Plan      Updated measurements, goals, and FOTO for Plan of Care update. Pt reported improved painfree ROM and decreased pain with MMT after DN. Added cable serratus punches during TE to improve serratus anterior strength. Pt tolerated all activities well.    Patient will continue to benefit from skilled outpatient physical therapy to address the deficits listed in the problem list box on initial evaluation, provide pt/family education and to maximize pt's level of independence in the home and community environment.     Patient's spiritual, cultural, and educational needs considered and patient agreeable to plan of care and goals.       Plan:   Updated plan of care. Continue treatment 2 times weekly for 12 weeks. Progress exercises per patient tolerance to improve shoulder and periscapular strength, ROM and function.     Goals:   Short Term Goals:  4 weeks Status  Date Met   PAIN: Pt will report worst pain of  4/10 in order to progress toward max functional ability and improve quality of life. [x] Progressing  [] Met  [] Not Met     FUNCTION: Patient will demonstrate improved function as indicated by a functional score of greater than or equal to 70 out of 100 on FOTO. [x] Progressing  [] Met  [] Not Met     MOBILITY: Patient will improve AROM to 50% of stated goals, listed in objective measures above, in order to progress towards independence with functional activities.  [x] Progressing  [] Met  [] Not Met     STRENGTH: Patient will improve strength to 50% of stated goals, listed in objective measures above, in order to progress towards independence with functional activities. [x] Progressing  [] Met  [] Not Met     HEP: Patient will demonstrate independence with HEP in order to progress toward functional independence. [x] Progressing  [] Met  [] Not Met        Long Term Goals:  6 weeks Status Date Met   PAIN: Pt will report worst pain of 2/10 in order to progress toward max functional ability and improve quality of life [x] Progressing  [] Met  [] Not Met     FUNCTION: Patient will demonstrate improved function as indicated by a functional score of greater than or equal to 75 out of 100 on FOTO. [x] Progressing  [] Met  [] Not Met     MOBILITY: Patient will improve AROM to stated goals, listed in objective measures above, in order to return to maximal functional potential and improve quality of life. Full, pain-free shoulder range of motion bilaterally [x] Progressing  [] Met  [] Not Met     STRENGTH: Patient will improve strength to stated goals, listed in objective measures above, in order to improve functional independence and quality of life. Goal: 5/5 and pain-free for all shoulder measures bilaterally [x] Progressing  [] Met  [] Not Met     Patient will return to gym activity with less than or equal to 2/10 pain and maximal function.  [x] Progressing  [] Met  [] Not Met       Ralph Potts, PT, DPT, SCS

## 2025-03-05 ENCOUNTER — CLINICAL SUPPORT (OUTPATIENT)
Facility: HOSPITAL | Age: 45
End: 2025-03-05
Payer: COMMERCIAL

## 2025-03-05 DIAGNOSIS — R29.898 WEAKNESS OF RIGHT SHOULDER: ICD-10-CM

## 2025-03-05 DIAGNOSIS — M25.611 DECREASED ROM OF RIGHT SHOULDER: Primary | ICD-10-CM

## 2025-03-05 PROCEDURE — 20561 NDL INSJ W/O NJX 3+ MUSC: CPT | Mod: PN,CG | Performed by: PHYSICAL THERAPIST

## 2025-03-05 PROCEDURE — 97110 THERAPEUTIC EXERCISES: CPT | Mod: PN | Performed by: PHYSICAL THERAPIST

## 2025-03-05 PROCEDURE — 97112 NEUROMUSCULAR REEDUCATION: CPT | Mod: PN | Performed by: PHYSICAL THERAPIST

## 2025-03-05 PROCEDURE — 97140 MANUAL THERAPY 1/> REGIONS: CPT | Mod: PN | Performed by: PHYSICAL THERAPIST

## 2025-03-05 NOTE — PROGRESS NOTES
[facility]   Physical Therapy Treatment Note     Name: Selena Spring  Clinic Number: [id number]    Therapy Diagnosis:   Encounter Diagnoses   Name Primary?    Decreased ROM of right shoulder Yes    Weakness of right shoulder        Physician: Cachorro Ramirez MD    Visit Date: 3/5/2025    Physician Orders: Eval and Treat  Medical Diagnosis: Rotator cuff impingement syndrome of right shoulder [M75.41], Right shoulder pain, unspecified chronicity [M25.511]      Visit # / Visits Authorized:  7/ 20 (1/1 eval)  Date of Evaluation:  1/25/2025   Insurance Authorization Period: 1/25/25 to 4/25/25  Plan of Care Certification:  2/19/25 to 5/14/25     Time In: 10:23 am  Time Out: 11:40 am  Total Billable Time: 77 minutes    SUBJECTIVE     Pt reports:  Increased pain in R shoulder following work over the weekend where she did prolonged overhead activities.   She was compliant with home exercise program.  Response to previous treatment: Shoulder soreness, decreased stiffness    Functional change: Increased pain with overhead activities     Pain: 5/10  Location: right upper trap and serratus    OBJECTIVE     Objective Measures updated at progress report unless specified.     Treatment     Selena received the treatments listed below:      Intervention  Code   (see below chart)  Performed   Today  Date/Notes   3/5/25    UBE   TE  X  3 FW/3 BW    GH Mobs  MT  X   X  Inf 30s3x   Post 30s3x    PROM  MT  X  Flex/abd/IR/ER 30s3x    Dry Needling  DN  X  Infraspinatus, supraspinatus, deltoid, and teres minor     Upper Trap Stretch  TE  X  30s3x     Prone row  NMR  X  5# 3x10    Prone extension  NMR  X  5# 3x10     Side lying ER  TE  X  3#, 2x10     ER isometric holds B  TE  X  Red ,3x20s    Dynamic Hugs  NMR  X  Red, 3x10    Farmer Walk  TA    20# kb; 3 x 25 yard laps    TB ER @ 0  TE  X  YL, 3x10 - jael    TB IR @ 0  TE  X  Red, 310 - jael      25 minutes of Therapeutic Exercise (TE) to develop strength,  endurance, ROM, and flexibility. (89452)   15 minutes of Manual therapy (MT) to improve pain and ROM. (71665)   15 minutes of Neuromuscular Re-Education (NR)  to improve: Balance, Coordination, Kinesthetic, Sense, Proprioception, and Posture. (51525)       Patient Education and Home Exercises     Home Exercises Provided and Patient Education Provided     Education provided:   - HEP, PT POC    Written Home Exercises Provided: yes. Exercises were reviewed and Selena was able to demonstrate them prior to the end of the session.  Selena demonstrated good  understanding of the education provided. See EMR under Patient Instructions for exercises provided during therapy sessions    ASSESSMENT   Pt reported improved painfree ROM and decreased pain with MMT after DN. Added upper trapezius stretch during TE to improve upper trap range of motion. Added resistance with external rotation isometric holds during NMR to improve periscapular musculature motor control.     Selena Is progressing well towards her goals.   Pt prognosis is Excellent.     Pt will continue to benefit from skilled outpatient physical therapy to address the deficits listed in the problem list box on initial evaluation, provide pt/family education and to maximize pt's level of independence in the home and community environment.     Pt's spiritual, cultural and educational needs considered and pt agreeable to plan of care and goals.     Anticipated barriers to physical therapy: None    Goals:   Short Term Goals:  4 weeks Status  Date Met   PAIN: Pt will report worst pain of 4/10 in order to progress toward max functional ability and improve quality of life. [x] Progressing  [] Met  [] Not Met     FUNCTION: Patient will demonstrate improved function as indicated by a functional score of greater than or equal to 70 out of 100 on FOTO. [x] Progressing  [] Met  [] Not Met     MOBILITY: Patient will improve AROM to 50% of stated goals, listed in objective measures  above, in order to progress towards independence with functional activities.  [x] Progressing  [] Met  [] Not Met     STRENGTH: Patient will improve strength to 50% of stated goals, listed in objective measures above, in order to progress towards independence with functional activities. [x] Progressing  [] Met  [] Not Met     HEP: Patient will demonstrate independence with HEP in order to progress toward functional independence. [x] Progressing  [] Met  [] Not Met        Long Term Goals:  6 weeks Status Date Met   PAIN: Pt will report worst pain of 2/10 in order to progress toward max functional ability and improve quality of life [x] Progressing  [] Met  [] Not Met     FUNCTION: Patient will demonstrate improved function as indicated by a functional score of greater than or equal to 75 out of 100 on FOTO. [x] Progressing  [] Met  [] Not Met     MOBILITY: Patient will improve AROM to stated goals, listed in objective measures above, in order to return to maximal functional potential and improve quality of life. Full, pain-free shoulder range of motion bilaterally [x] Progressing  [] Met  [] Not Met     STRENGTH: Patient will improve strength to stated goals, listed in objective measures above, in order to improve functional independence and quality of life. Goal: 5/5 and pain-free for all shoulder measures bilaterally [x] Progressing  [] Met  [] Not Met     Patient will return to gym activity with less than or equal to 2/10 pain and maximal function.  [x] Progressing  [] Met  [] Not Met         PLAN   Continue per plan of care.  Progress as tolerated.    Ralph Potts, PT, DPT

## 2025-03-18 ENCOUNTER — CLINICAL SUPPORT (OUTPATIENT)
Facility: HOSPITAL | Age: 45
End: 2025-03-18
Payer: COMMERCIAL

## 2025-03-18 DIAGNOSIS — M25.611 DECREASED ROM OF RIGHT SHOULDER: Primary | ICD-10-CM

## 2025-03-18 DIAGNOSIS — R29.898 WEAKNESS OF RIGHT SHOULDER: ICD-10-CM

## 2025-03-18 PROCEDURE — 97110 THERAPEUTIC EXERCISES: CPT | Mod: PN | Performed by: PHYSICAL THERAPIST

## 2025-03-18 PROCEDURE — 97112 NEUROMUSCULAR REEDUCATION: CPT | Mod: PN | Performed by: PHYSICAL THERAPIST

## 2025-03-18 PROCEDURE — 97140 MANUAL THERAPY 1/> REGIONS: CPT | Mod: PN | Performed by: PHYSICAL THERAPIST

## 2025-03-18 NOTE — PROGRESS NOTES
"[facility]   Physical Therapy Treatment Note     Name: Selena Spring  Clinic Number: [id number]    Therapy Diagnosis:   Encounter Diagnoses   Name Primary?    Decreased ROM of right shoulder Yes    Weakness of right shoulder        Physician: Cachorro Ramirez MD    Visit Date: 3/18/2025    Physician Orders: Eval and Treat  Medical Diagnosis: Rotator cuff impingement syndrome of right shoulder [M75.41], Right shoulder pain, unspecified chronicity [M25.511]      Visit # / Visits Authorized:  8 / 20 (1/1 eval)  Date of Evaluation:  1/25/2025   Insurance Authorization Period: 1/25/25 to 4/25/25  Plan of Care Certification:  2/19/25 to 5/14/25     Time In: 10:35  Time Out: 11:50   Total Billable Time: 75 minutes    SUBJECTIVE     Pt reports: moderate pain in her posterior shld.   Response to previous treatment: mild decrease in symptoms.   Functional change: continued pain with using RUE for ADL's.     Pain: 5/10  Location: right upper trap and serratus    OBJECTIVE     Objective Measures updated at progress report unless specified.     Treatment     Selena received the treatments listed below:      Intervention Code  (see below chart) Performed  Today Date/Notes  3/18/25   Gh Mobs         PROM         Dry Needling         Astym MT X RUE   UBE  TE X 3 FW/3 BW   Upper Trap Stretch TE   30s3x    Prone row NMR   5# 3x10   Prone extension NMR X  X R: 5# 3x10   L: 3#, 3x10   Prone T NMR X 2x10 - jael   Prone Y NMR   2x10   Side lying ER TE X R: 5#, 2x10 - jael   ER Walkout TE   5" hold, 3x10   Dynamic Hugs NMR X Red, 3x10   Farmer Walk TA   20# kb; 3 x 25 yard laps   TB ER @ 0 TE   YL, 3x10 - jael   TB IR @ 0 TE   Red, 310 - jael   Rows - seated NMR X    10# 3x10   Lat Pulldown - seated NMR    X 3x10, 15#   Cable Serratus Punch TE   10#, 3x10   10 minutes of Therapeutic Exercise (TE) to develop strength, endurance, ROM, and flexibility. (53358)  15 minutes of Manual therapy (MT) to improve pain " and ROM. (02801)  40 minutes of Neuromuscular Re-Education (NR)  to improve: Balance, Coordination, Kinesthetic, Sense, Proprioception, and Posture. (65574)      Patient Education and Home Exercises     Home Exercises Provided and Patient Education Provided     Education provided:   - HEP, PT POC    Written Home Exercises Provided: yes. Exercises were reviewed and Selena was able to demonstrate them prior to the end of the session.  Selena demonstrated good  understanding of the education provided. See EMR under Patient Instructions for exercises provided during therapy sessions    ASSESSMENT   Performed Astym during MT to improve UE soft tissue mobility/pain. Added Prone T during NMR to improve shoulder motor control.     Selena Is progressing well towards her goals.   Pt prognosis is Excellent.     Pt will continue to benefit from skilled outpatient physical therapy to address the deficits listed in the problem list box on initial evaluation, provide pt/family education and to maximize pt's level of independence in the home and community environment.     Pt's spiritual, cultural and educational needs considered and pt agreeable to plan of care and goals.     Anticipated barriers to physical therapy: None    Goals:   Short Term Goals:  4 weeks Status  Date Met   PAIN: Pt will report worst pain of 4/10 in order to progress toward max functional ability and improve quality of life. [x] Progressing  [] Met  [] Not Met     FUNCTION: Patient will demonstrate improved function as indicated by a functional score of greater than or equal to 70 out of 100 on FOTO. [x] Progressing  [] Met  [] Not Met     MOBILITY: Patient will improve AROM to 50% of stated goals, listed in objective measures above, in order to progress towards independence with functional activities.  [x] Progressing  [] Met  [] Not Met     STRENGTH: Patient will improve strength to 50% of stated goals, listed in objective measures above, in order to  progress towards independence with functional activities. [x] Progressing  [] Met  [] Not Met     HEP: Patient will demonstrate independence with HEP in order to progress toward functional independence. [x] Progressing  [] Met  [] Not Met        Long Term Goals:  6 weeks Status Date Met   PAIN: Pt will report worst pain of 2/10 in order to progress toward max functional ability and improve quality of life [x] Progressing  [] Met  [] Not Met     FUNCTION: Patient will demonstrate improved function as indicated by a functional score of greater than or equal to 75 out of 100 on FOTO. [x] Progressing  [] Met  [] Not Met     MOBILITY: Patient will improve AROM to stated goals, listed in objective measures above, in order to return to maximal functional potential and improve quality of life. Full, pain-free shoulder range of motion bilaterally [x] Progressing  [] Met  [] Not Met     STRENGTH: Patient will improve strength to stated goals, listed in objective measures above, in order to improve functional independence and quality of life. Goal: 5/5 and pain-free for all shoulder measures bilaterally [x] Progressing  [] Met  [] Not Met     Patient will return to gym activity with less than or equal to 2/10 pain and maximal function.  [x] Progressing  [] Met  [] Not Met         PLAN   Continue per plan of care.  Progress as tolerated.    Ralph Potts, PT, DPT

## 2025-03-20 ENCOUNTER — CLINICAL SUPPORT (OUTPATIENT)
Facility: HOSPITAL | Age: 45
End: 2025-03-20
Payer: COMMERCIAL

## 2025-03-20 DIAGNOSIS — R29.898 WEAKNESS OF RIGHT SHOULDER: ICD-10-CM

## 2025-03-20 DIAGNOSIS — M25.611 DECREASED ROM OF RIGHT SHOULDER: Primary | ICD-10-CM

## 2025-03-20 PROCEDURE — 97140 MANUAL THERAPY 1/> REGIONS: CPT | Mod: PN | Performed by: PHYSICAL THERAPIST

## 2025-03-20 PROCEDURE — 97112 NEUROMUSCULAR REEDUCATION: CPT | Mod: PN | Performed by: PHYSICAL THERAPIST

## 2025-03-20 PROCEDURE — 97110 THERAPEUTIC EXERCISES: CPT | Mod: PN | Performed by: PHYSICAL THERAPIST

## 2025-03-20 NOTE — PROGRESS NOTES
Outpatient Rehab    Physical Therapy Progress Note    Name: Selena Spring  Clinic Number: [id number]    Therapy Diagnosis:   Encounter Diagnoses   Name Primary?    Decreased ROM of right shoulder Yes    Weakness of right shoulder        Physician: Cachorro Ramirez MD    Visit Date: 3/20/2025    Physician Orders: Eval and Treat  Medical Diagnosis: Rotator cuff impingement syndrome of right shoulder [M75.41], Right shoulder pain, unspecified chronicity [M25.511]      Visit # / Visits Authorized:  9/ 20 (1/1 eval)  Date of Evaluation:  1/25/2025   Insurance Authorization Period: 1/25/25 to 4/25/25  Plan of Care Certification:  2/19/25 to 5/14/25     Time In: 10:30 am  Time Out: 11:45 am  Total Billable Time: 75 minutes    SUBJECTIVE     Pt reports: mild-moderate posterior shoulder pain.   Response to previous treatment: decreased shoulder pain.   Functional change: continued pain with using RUE for ADL's.     Pain at Worst: 5/10  Location: right upper trap and serratus    OBJECTIVE     RANGE OF MOTION:  *denotes pain     Cervical ROM  (Degrees) Right    Left    Goal   Cervical Side Bending  45* 45 45   Cervical Rotation  70* 80 90     Shoulder ROM  (Degrees) Right Left Goal   Shoulder Flexion 160 160 -   Shoulder Extension  30 60 60   Shoulder Abduction  160 160 -   Functional ER Funcitonal Nonpainful  Funcitonal Nonpainful  -   Functional IR Funcitonal Nonpainful  Funcitonal Nonpainful  -         STRENGTH:  *denotes pain     MMT Right Left Goal   Infraspinatus  4+/5* 5/5 5/5   Subscapularis  5/5 5/5 5/5   Supraspinatus  4/5* 5/5 5/5   Teres Minor 5/5 5/5 5/5   Rhomboid NT NT 5/5   Lower Trapezius  NT NT 5/5   Latissimus Dorsi NT NT 5/5         FUNCTION:     Intake Outcome Measure for FOTO Shoulder Survey    Therapist reviewed FOTO scores for Selena Spring on 9/9/2024.   FOTO report - see Media section or FOTO account episode details.    Intake Score: 60%         Treatment     Selena received the  "treatments listed below:    Intervention Code  (see below chart) Performed  Today Date/Notes  3/20/25   Astym MT X RUE   UBE  TE X 3 FW/3 BW   Prone extension NMR X  X R: 5# 3x10   L: 4#, 3x10   Prone T NMR X 3x10 - jael   Prone Y NMR   2x10   Side lying ER TE X R: 5#, 3x10 - jael   ER Walkout TE   5" hold, 3x10   Dynamic Hugs NMR X green, 3x10   Farmer Walk TA X 20# kb; 2 x 25 yard laps   Rows - seated NMR X    10# 3x10   Lat Pulldown - seated NMR    X 3x10, 15#   Cable Serratus Punch TE   10#, 3x10   10 minutes of Therapeutic Exercise (TE) to develop strength, endurance, ROM, and flexibility. (22869)  15 minutes of Manual therapy (MT) to improve pain and ROM. (66860)  40 minutes of Neuromuscular Re-Education (NR)  to improve: Balance, Coordination, Kinesthetic, Sense, Proprioception, and Posture. (43496)      Patient Education and Home Exercises     Home Exercises Provided and Patient Education Provided     Education provided:   - HEP, PT POC    Written Home Exercises Provided: yes. Exercises were reviewed and Selena was able to demonstrate them prior to the end of the session.  Selena demonstrated good  understanding of the education provided. See EMR under Patient Instructions for exercises provided during therapy sessions    ASSESSMENT      Updated goals, measurements, and FOTO for Progress Note during TE. Performed Astym during MT to improve pain/ROM. Increased resistance on Dynamic Hugs to improve shoulder motor control.     Patient is progressing well with cervical and shoulder ROM, intensity of pain with ADL's, and shoulder strength; however, patient remains limited with shoulder strength and frequency of pain with ADL's.  Patient will benefit from continued skilled therapy address remaining limitations.        Selena Is progressing well towards her goals.   Pt prognosis is Excellent.     Pt will continue to benefit from skilled outpatient physical therapy to address the deficits listed in the problem list " box on initial evaluation, provide pt/family education and to maximize pt's level of independence in the home and community environment.     Pt's spiritual, cultural and educational needs considered and pt agreeable to plan of care and goals.     Anticipated barriers to physical therapy: None    Goals:   Short Term Goals:  4 weeks Status  Date Met   PAIN: Pt will report worst pain of 4/10 in order to progress toward max functional ability and improve quality of life. [x] Progressing  [] Met  [] Not Met     FUNCTION: Patient will demonstrate improved function as indicated by a functional score of greater than or equal to 70 out of 100 on FOTO. [x] Progressing  [] Met  [] Not Met     MOBILITY: Patient will improve AROM to 50% of stated goals, listed in objective measures above, in order to progress towards independence with functional activities.  [] Progressing  [x] Met  [] Not Met  3/20/25   STRENGTH: Patient will improve strength to 50% of stated goals, listed in objective measures above, in order to progress towards independence with functional activities. [] Progressing  [x] Met  [] Not Met  3/20/25   HEP: Patient will demonstrate independence with HEP in order to progress toward functional independence. [] Progressing  [x] Met  [] Not Met  3/20/25      Long Term Goals:  6 weeks Status Date Met   PAIN: Pt will report worst pain of 2/10 in order to progress toward max functional ability and improve quality of life [x] Progressing  [] Met  [] Not Met     FUNCTION: Patient will demonstrate improved function as indicated by a functional score of greater than or equal to 75 out of 100 on FOTO. [x] Progressing  [] Met  [] Not Met     MOBILITY: Patient will improve AROM to stated goals, listed in objective measures above, in order to return to maximal functional potential and improve quality of life. Full, pain-free shoulder range of motion bilaterally [x] Progressing  [] Met  [] Not Met     STRENGTH: Patient will  improve strength to stated goals, listed in objective measures above, in order to improve functional independence and quality of life. Goal: 5/5 and pain-free for all shoulder measures bilaterally [x] Progressing  [] Met  [] Not Met     Patient will return to gym activity with less than or equal to 2/10 pain and maximal function.  [x] Progressing  [] Met  [] Not Met         PLAN   Continue per plan of care.  Progress as tolerated.    Ralph Potts, PT, DPT, SCS

## 2025-04-01 ENCOUNTER — CLINICAL SUPPORT (OUTPATIENT)
Facility: HOSPITAL | Age: 45
End: 2025-04-01
Payer: COMMERCIAL

## 2025-04-01 DIAGNOSIS — R29.898 WEAKNESS OF RIGHT SHOULDER: ICD-10-CM

## 2025-04-01 DIAGNOSIS — M25.611 DECREASED ROM OF RIGHT SHOULDER: Primary | ICD-10-CM

## 2025-04-01 PROCEDURE — 97110 THERAPEUTIC EXERCISES: CPT | Mod: PN | Performed by: PHYSICAL THERAPIST

## 2025-04-01 PROCEDURE — 20561 NDL INSJ W/O NJX 3+ MUSC: CPT | Mod: PN,CG | Performed by: PHYSICAL THERAPIST

## 2025-04-01 PROCEDURE — 97112 NEUROMUSCULAR REEDUCATION: CPT | Mod: PN | Performed by: PHYSICAL THERAPIST

## 2025-04-01 NOTE — PROGRESS NOTES
[facility]   Physical Therapy Treatment Note     Name: Selena Spring  Clinic Number: [id number]    Therapy Diagnosis:   Encounter Diagnoses   Name Primary?    Decreased ROM of right shoulder Yes    Weakness of right shoulder        Physician: Cachorro Ramirez MD    Visit Date: 4/1/2025    Physician Orders: Eval and Treat  Medical Diagnosis: Rotator cuff impingement syndrome of right shoulder [M75.41], Right shoulder pain, unspecified chronicity [M25.511]      Visit # / Visits Authorized:  10 / 20 (1/1 eval)  Date of Evaluation:  1/25/2025   Insurance Authorization Period: 1/25/25 to 4/25/25  Plan of Care Certification:  2/19/25 to 5/14/25     Time In: 10:25  Time Out: 11:50  Total Billable Time: 85 minutes     SUBJECTIVE     Pt reports: mild posterior shld pain she believes due to increased ADL's requiring her to carry a medium weight object for a prolonged period of time.    Response to previous treatment: pt reports experiencing increased shld pain mildly later that night and more so first thing the next morning.   Functional change: continued shld pain with her shoulder hanging by her side with carrying a jug over the weekend or with sitting with her RUE unsupported.      Pain: 5/10  Location: right upper trap and serratus    OBJECTIVE     Objective Measures updated at progress report unless specified.     Treatment     Selena received the treatments listed below:      Intervention Code  (see below chart) Performed  Today Date/Notes  4/1/25   Gh Mobs         PROM         Dry Needling DN X Infraspinatus, Teres Minor, Supraspinatus, and Deltoid with E-Stim   Astym MT   RUE   UBE  TE   3 FW/3 BW   Sidelying ER NMR X 5#, 3x10   Sidelying Flexion NMR X To 90: 0#, 2x10   Prone row NMR   5# 3x10   Prone extension NMR X  X R: 5# 3x10   L: 5#, 3x10   Prone T NMR X R: 1#, 3x10  L: 0#, 3x10   ER @ 0 TE X Red, 3x10 - jael   IR @ 0 TE X Green, 3x10 - jael   ER Walkout NMR       IR Walkout  NMR       Rows - seated NMR X 10# 3x10   Lat Pulldown - seated NMR X 15#, 3x10   25 minutes of Therapeutic Exercise (TE) to develop strength, endurance, ROM, and flexibility. (37301)  0 minutes of Manual therapy (MT) to improve pain and ROM. ([id number])  15 minutes of Dry Needling (DN) to improve pain and ROM.   40 minutes of Neuromuscular Re-Education (NR)  to improve: Balance, Coordination, Kinesthetic, Sense, Proprioception, and Posture. ([id number])        Patient Education and Home Exercises     Home Exercises Provided and Patient Education Provided     Education provided:   - HEP, PT POC    Written Home Exercises Provided: yes. Exercises were reviewed and Selena was able to demonstrate them prior to the end of the session.  Selena demonstrated good  understanding of the education provided. See EMR under Patient Instructions for exercises provided during therapy sessions    ASSESSMENT   Performed DN to alleivate shoulder pain with ADL's. Progressed multiple exercises during NMR to improve rotator cuff motor control. Increased resistance on ER and IR at 0 during TE to improve rotator cuff strength. Educated patient on pain and treatment options for shoulder during TE.       Selena Is progressing well towards her goals.   Pt prognosis is Excellent.     Pt will continue to benefit from skilled outpatient physical therapy to address the deficits listed in the problem list box on initial evaluation, provide pt/family education and to maximize pt's level of independence in the home and community environment.     Pt's spiritual, cultural and educational needs considered and pt agreeable to plan of care and goals.     Anticipated barriers to physical therapy: None    Goals:   Short Term Goals:  4 weeks Status  Date Met   PAIN: Pt will report worst pain of 4/10 in order to progress toward max functional ability and improve quality of life. [x] Progressing  [] Met  [] Not Met     FUNCTION: Patient will demonstrate improved  function as indicated by a functional score of greater than or equal to 70 out of 100 on FOTO. [x] Progressing  [] Met  [] Not Met     MOBILITY: Patient will improve AROM to 50% of stated goals, listed in objective measures above, in order to progress towards independence with functional activities.  [x] Progressing  [] Met  [] Not Met     STRENGTH: Patient will improve strength to 50% of stated goals, listed in objective measures above, in order to progress towards independence with functional activities. [x] Progressing  [] Met  [] Not Met     HEP: Patient will demonstrate independence with HEP in order to progress toward functional independence. [x] Progressing  [] Met  [] Not Met        Long Term Goals:  6 weeks Status Date Met   PAIN: Pt will report worst pain of 2/10 in order to progress toward max functional ability and improve quality of life [x] Progressing  [] Met  [] Not Met     FUNCTION: Patient will demonstrate improved function as indicated by a functional score of greater than or equal to 75 out of 100 on FOTO. [x] Progressing  [] Met  [] Not Met     MOBILITY: Patient will improve AROM to stated goals, listed in objective measures above, in order to return to maximal functional potential and improve quality of life. Full, pain-free shoulder range of motion bilaterally [x] Progressing  [] Met  [] Not Met     STRENGTH: Patient will improve strength to stated goals, listed in objective measures above, in order to improve functional independence and quality of life. Goal: 5/5 and pain-free for all shoulder measures bilaterally [x] Progressing  [] Met  [] Not Met     Patient will return to gym activity with less than or equal to 2/10 pain and maximal function.  [x] Progressing  [] Met  [] Not Met         PLAN   Continue per plan of care.  Progress as tolerated.    Ralph Potts, PT, DPT

## 2025-04-03 ENCOUNTER — CLINICAL SUPPORT (OUTPATIENT)
Facility: HOSPITAL | Age: 45
End: 2025-04-03
Attending: PHYSICAL THERAPIST
Payer: COMMERCIAL

## 2025-04-03 DIAGNOSIS — R29.898 WEAKNESS OF RIGHT SHOULDER: ICD-10-CM

## 2025-04-03 DIAGNOSIS — M25.611 DECREASED ROM OF RIGHT SHOULDER: Primary | ICD-10-CM

## 2025-04-03 PROCEDURE — 97110 THERAPEUTIC EXERCISES: CPT | Mod: PN | Performed by: PHYSICAL THERAPIST

## 2025-04-03 PROCEDURE — 97112 NEUROMUSCULAR REEDUCATION: CPT | Mod: PN | Performed by: PHYSICAL THERAPIST

## 2025-04-03 PROCEDURE — 97140 MANUAL THERAPY 1/> REGIONS: CPT | Mod: PN | Performed by: PHYSICAL THERAPIST

## 2025-04-03 NOTE — PROGRESS NOTES
"[facility]   Physical Therapy Treatment Note       Patient Name: Selena Spring  MRN: [id number]  YOB: 1980  Encounter Date: 4/3/2025    Therapy Diagnosis:   Encounter Diagnoses   Name Primary?    Decreased ROM of right shoulder Yes    Weakness of right shoulder      Physician: Cachorro Ramirez MD    Physician Orders: Eval and Treat  Medical Diagnosis: Rotator cuff impingement syndrome of right shoulder  Right shoulder pain, unspecified chronicity    Visit # / Visits Authorized:  11 / 12  Insurance Authorization Period: 1/25/2025 to 4/25/2025  Date of Evaluation:  1/25/2025   Plan of Care Certification:  2/19/25 to 5/14/25     Time In: 3:13   Time Out: 4:10  Total Billable Time: 57 minutes    SUBJECTIVE     Pt reports: mild-moderate R shld pain today.   Response to previous treatment: increased pain the next day, pt believes due to progression of exercise.   Functional change: continued shld pain with her shoulder hanging by her side with carrying a jug over the weekend or with sitting with her RUE unsupported.     Pain: 5/10  Location: right upper trap and serratus    OBJECTIVE     Objective Measures updated at progress report unless specified.     Treatment     Selena received the treatments listed below:      Intervention Code  (see below chart) Performed  Today Date/Notes  4/3/25   Gh Mobs MT X  X Post: 30"x3  Inf: 30"x3   PROM MT X IR: 30"x3   Dry Needling DN   Infraspinatus, Teres Minor, Supraspinatus, and Deltoid with E-Stim   Astym MT   RUE   UBE  TE X 3 FW/3 BW   Sidelying ER NMR X 5#, 3x10   Sidelying Flexion NMR Hold To 90: 0#, 2x10   Prone row NMR   5# 3x10   Prone extension NMR X  X R: 5# 3x10   L: 5#, 3x10   Prone T NMR X R: 0#, 3x10  L: 1#, 3x10   ER @ 0 TE X Red, 3x10 - jael   IR @ 0 TE X Blue, 3x10 - jael   Rows - seated NMR X 5# 3x10   Lat Pulldown - seated NMR X 10#, 3x10   10 minutes of Manual therapy (MT) to improve pain and ROM. ([id number])  0 minutes " of Dry Needling (DN) to improve pain and ROM.   10 minutes of Therapeutic Exercise (TE) to develop strength, endurance, ROM, and flexibility. (26153)  35 minutes of Neuromuscular Re-Education (NR)  to improve: Balance, Coordination, Kinesthetic, Sense, Proprioception, and Posture. ([id number])        Patient Education and Home Exercises     Home Exercises Provided and Patient Education Provided     Education provided:   - reviewed for understanding.    Written Home Exercises Provided: Patient instructed to cont prior HEP. Exercises were reviewed and Selena was able to demonstrate them prior to the end of the session.  Selena demonstrated good  understanding of the education provided. See EMR under Patient Instructions for exercises provided during therapy sessions    ASSESSMENT   Performed MT to alleviate pain with movements; however, pt denied significant change after MT. Held Sidelying Flexion due to increase in pain after last visit.       Selena Is progressing well towards her goals.   Pt prognosis is Excellent.     Pt will continue to benefit from skilled outpatient physical therapy to address the deficits listed in the problem list box on initial evaluation, provide pt/family education and to maximize pt's level of independence in the home and community environment.     Pt's spiritual, cultural and educational needs considered and pt agreeable to plan of care and goals.    Goals:   Short Term Goals:  4 weeks Status  Date Met   PAIN: Pt will report worst pain of 4/10 in order to progress toward max functional ability and improve quality of life. [x] Progressing  [] Met  [] Not Met     FUNCTION: Patient will demonstrate improved function as indicated by a functional score of greater than or equal to 70 out of 100 on FOTO. [x] Progressing  [] Met  [] Not Met     MOBILITY: Patient will improve AROM to 50% of stated goals, listed in objective measures above, in order to progress towards independence with functional  activities.  [x] Progressing  [] Met  [] Not Met     STRENGTH: Patient will improve strength to 50% of stated goals, listed in objective measures above, in order to progress towards independence with functional activities. [x] Progressing  [] Met  [] Not Met     HEP: Patient will demonstrate independence with HEP in order to progress toward functional independence. [x] Progressing  [] Met  [] Not Met        Long Term Goals:  6 weeks Status Date Met   PAIN: Pt will report worst pain of 2/10 in order to progress toward max functional ability and improve quality of life [x] Progressing  [] Met  [] Not Met     FUNCTION: Patient will demonstrate improved function as indicated by a functional score of greater than or equal to 75 out of 100 on FOTO. [x] Progressing  [] Met  [] Not Met     MOBILITY: Patient will improve AROM to stated goals, listed in objective measures above, in order to return to maximal functional potential and improve quality of life. Full, pain-free shoulder range of motion bilaterally [x] Progressing  [] Met  [] Not Met     STRENGTH: Patient will improve strength to stated goals, listed in objective measures above, in order to improve functional independence and quality of life. Goal: 5/5 and pain-free for all shoulder measures bilaterally [x] Progressing  [] Met  [] Not Met     Patient will return to gym activity with less than or equal to 2/10 pain and maximal function.  [x] Progressing  [] Met  [] Not Met         PLAN   Continue Plan of Care (POC) and progress per patient tolerance. See treatment section for details on planned progressions next session.      Ralph Potts, PT, DPT, DPT, SCS

## 2025-04-07 ENCOUNTER — CLINICAL SUPPORT (OUTPATIENT)
Facility: HOSPITAL | Age: 45
End: 2025-04-07
Payer: COMMERCIAL

## 2025-04-07 ENCOUNTER — PATIENT MESSAGE (OUTPATIENT)
Dept: SPORTS MEDICINE | Facility: CLINIC | Age: 45
End: 2025-04-07
Payer: COMMERCIAL

## 2025-04-07 DIAGNOSIS — M25.611 DECREASED ROM OF RIGHT SHOULDER: Primary | ICD-10-CM

## 2025-04-07 DIAGNOSIS — R29.898 WEAKNESS OF RIGHT SHOULDER: ICD-10-CM

## 2025-04-07 PROCEDURE — 97112 NEUROMUSCULAR REEDUCATION: CPT | Mod: PN | Performed by: PHYSICAL THERAPIST

## 2025-04-07 PROCEDURE — 97140 MANUAL THERAPY 1/> REGIONS: CPT | Mod: PN | Performed by: PHYSICAL THERAPIST

## 2025-04-07 PROCEDURE — 97110 THERAPEUTIC EXERCISES: CPT | Mod: PN | Performed by: PHYSICAL THERAPIST

## 2025-04-07 NOTE — PROGRESS NOTES
"[facility]   Physical Therapy Treatment Note       Patient Name: Selena Spring  MRN: [id number]  YOB: 1980  Encounter Date: 4/7/2025    Therapy Diagnosis:   Encounter Diagnoses   Name Primary?    Decreased ROM of right shoulder Yes    Weakness of right shoulder      Physician: Cachorro Ramirez MD    Physician Orders: Eval and Treat  Medical Diagnosis: Rotator cuff impingement syndrome of right shoulder  Right shoulder pain, unspecified chronicity    Visit # / Visits Authorized:  12 / 12  Insurance Authorization Period: 1/25/2025 to 4/25/2025  Date of Evaluation:  1/25/2025   Plan of Care Certification:  2/19/25 to 5/14/25     Time In: 10:30  Time Out: 11:35  Total Billable Time: 65 minutes    SUBJECTIVE     Pt reports: soreness but no sig pain. Pt reports experiencing significant pain yesterday.   Response to previous treatment: tolerated well without pain/soreness.   Functional change:     Pain: 5/10  Location: right upper trap and serratus    OBJECTIVE     Objective Measures updated at progress report unless specified.     Treatment     Selena received the treatments listed below:      Intervention Code  (see below chart) Performed  Today Date/Notes  4/7/25   Gh Mobs MT X  X Post: 30"x3  Inf: 30"x3   PROM MT X IR: 30"x3   Dry Needling DN   Infraspinatus, Teres Minor, Supraspinatus, and Deltoid with E-Stim   Astym MT   RUE   UBE  TE X 3 FW/3 BW   Sidelying ER NMR X 5#, 3x10, B   Sidelying Flexion NMR Hold To 90: 0#, 2x10   Prone row NMR   5# 3x10   Prone extension NMR X  X R: 6# 3x10   L: 6#, 3x10   Prone T NMR X R: 0#, 3x10  L: 2# 3x10   ER @ 0 TE X Red, 3x10 - jael   IR @ 0 TE X Blue, 3x10 - jael   Rows - seated NMR X 5#, 1x10  7.5# 2x10   Lat Pulldown - seated NMR X 10, 7.5#, 3x10   10 minutes of Manual therapy (MT) to improve pain and ROM. ([id number])  0 minutes of Dry Needling (DN) to improve pain and ROM.   15 minutes of Therapeutic Exercise (TE) to develop " strength, endurance, ROM, and flexibility. (01557)  30 minutes of Neuromuscular Re-Education (NR)  to improve: Balance, Coordination, Kinesthetic, Sense, Proprioception, and Posture. ([id number])          Patient Education and Home Exercises     Home Exercises Provided and Patient Education Provided     Education provided:   - reviewed for understanding.    Written Home Exercises Provided: Patient instructed to cont prior HEP. Exercises were reviewed and Selena was able to demonstrate them prior to the end of the session.  Selena demonstrated good  understanding of the education provided. See EMR under Patient Instructions for exercises provided during therapy sessions    ASSESSMENT   Patient reported improved shoulder mobility after MT. Progressed Prone Extension, Prone T, and Lat Pulldowns during NMR to improve shoulder motor control. Patient tolerated today's session well without complaint of pain.       Selena Is progressing well towards her goals.   Pt prognosis is Excellent.     Pt will continue to benefit from skilled outpatient physical therapy to address the deficits listed in the problem list box on initial evaluation, provide pt/family education and to maximize pt's level of independence in the home and community environment.     Pt's spiritual, cultural and educational needs considered and pt agreeable to plan of care and goals.    Goals:   Short Term Goals:  4 weeks Status  Date Met   PAIN: Pt will report worst pain of 4/10 in order to progress toward max functional ability and improve quality of life. [x] Progressing  [] Met  [] Not Met     FUNCTION: Patient will demonstrate improved function as indicated by a functional score of greater than or equal to 70 out of 100 on FOTO. [x] Progressing  [] Met  [] Not Met     MOBILITY: Patient will improve AROM to 50% of stated goals, listed in objective measures above, in order to progress towards independence with functional activities.  [x] Progressing  [] Met  []  Not Met     STRENGTH: Patient will improve strength to 50% of stated goals, listed in objective measures above, in order to progress towards independence with functional activities. [x] Progressing  [] Met  [] Not Met     HEP: Patient will demonstrate independence with HEP in order to progress toward functional independence. [x] Progressing  [] Met  [] Not Met        Long Term Goals:  6 weeks Status Date Met   PAIN: Pt will report worst pain of 2/10 in order to progress toward max functional ability and improve quality of life [x] Progressing  [] Met  [] Not Met     FUNCTION: Patient will demonstrate improved function as indicated by a functional score of greater than or equal to 75 out of 100 on FOTO. [x] Progressing  [] Met  [] Not Met     MOBILITY: Patient will improve AROM to stated goals, listed in objective measures above, in order to return to maximal functional potential and improve quality of life. Full, pain-free shoulder range of motion bilaterally [x] Progressing  [] Met  [] Not Met     STRENGTH: Patient will improve strength to stated goals, listed in objective measures above, in order to improve functional independence and quality of life. Goal: 5/5 and pain-free for all shoulder measures bilaterally [x] Progressing  [] Met  [] Not Met     Patient will return to gym activity with less than or equal to 2/10 pain and maximal function.  [x] Progressing  [] Met  [] Not Met         PLAN   Continue Plan of Care (POC) and progress per patient tolerance. See treatment section for details on planned progressions next session.      Ralph Potts, PT, DPT, DPT, SCS

## 2025-04-10 ENCOUNTER — CLINICAL SUPPORT (OUTPATIENT)
Facility: HOSPITAL | Age: 45
End: 2025-04-10
Payer: COMMERCIAL

## 2025-04-10 DIAGNOSIS — R29.898 WEAKNESS OF RIGHT SHOULDER: ICD-10-CM

## 2025-04-10 DIAGNOSIS — M25.611 DECREASED ROM OF RIGHT SHOULDER: Primary | ICD-10-CM

## 2025-04-10 PROCEDURE — 97112 NEUROMUSCULAR REEDUCATION: CPT | Mod: PN | Performed by: PHYSICAL THERAPIST

## 2025-04-10 PROCEDURE — 97110 THERAPEUTIC EXERCISES: CPT | Mod: PN | Performed by: PHYSICAL THERAPIST

## 2025-04-10 PROCEDURE — 97140 MANUAL THERAPY 1/> REGIONS: CPT | Mod: PN | Performed by: PHYSICAL THERAPIST

## 2025-04-10 NOTE — PROGRESS NOTES
"[facility]   Physical Therapy Treatment Note       Patient Name: Selena Spring  MRN: [id number]  YOB: 1980  Encounter Date: 4/10/2025    Therapy Diagnosis:   Encounter Diagnoses   Name Primary?    Decreased ROM of right shoulder Yes    Weakness of right shoulder      Physician: Cachorro Ramirez MD    Physician Orders: Eval and Treat  Medical Diagnosis: Rotator cuff impingement syndrome of right shoulder  Right shoulder pain, unspecified chronicity    Visit # / Visits Authorized:  13 / 12  Insurance Authorization Period: 1/25/2025 to 4/25/2025  Date of Evaluation:  1/25/2025   Plan of Care Certification:  2/19/25 to 5/14/25     Time In: 10:30 am  Time Out: 11:45 am  Total Billable Time: 75 min    SUBJECTIVE     Pt reports: mild pain today but significantly decreased pain with ADL's recently.  Response to previous treatment: patient reports experiencing a "deep ache" for about 36 hours after last visit but states that she had a significant improvement in ADL's after that.   Functional change: decreased pain with all ADL's.     Pain: 3/10  Location: right upper trap and serratus    OBJECTIVE     Objective Measures updated at progress report unless specified.     Treatment     Selena received the treatments listed below:      Intervention Code  (see below chart) Performed  Today Date/Notes  4/10/25   Gh Mobs MT X  X Post: 30"x3  Inf: 30"x3   PROM MT X IR: 30"x3   Clavicle Mobs MT X All directions and wiggle: 30"x3   MWM MT x Overhead flexion with scap mob: 3x10   Dry Needling DN   Infraspinatus, Teres Minor, Supraspinatus, and Deltoid with E-Stim   Astym MT   RUE   UBE  TE X 3 FW/3 BW   Pec Stretch TE X Corner: 30"x3   Sidelying ER NMR X 5#, 3x10, B   Sidelying Flexion NMR Hold To 90: 0#, 2x10   Prone extension NMR X  X R: 6# 3x10   L: 6#, 3x10   Prone T NMR X R: 0#, 3x10  L: 2# 3x10   SwitchedOn: Plank w/ Dec Making NMR X 3 rounds   ER @ 0 TE X Red, 3x10 - jael   IR @ 0 " TE X Blue, 3x10 - jale   Rows - seated NMR X 5#, 1x10  7.5# 2x10   Lat Pulldown - seated NMR X 10, 7.5#, 3x10   10 minutes of Manual therapy (MT) to improve pain and ROM. (99173)  0 minutes of Dry Needling (DN) to improve pain and ROM.   15 minutes of Therapeutic Exercise (TE) to develop strength, endurance, ROM, and flexibility. ([id number])  30 minutes of Neuromuscular Re-Education (NR)  to improve: Balance, Coordination, Kinesthetic, Sense, Proprioception, and Posture. (71517)        Patient Education and Home Exercises     Home Exercises Provided and Patient Education Provided     Education provided:   - reviewed for understanding.    Written Home Exercises Provided: Patient instructed to cont prior HEP. Exercises were reviewed and Selena was able to demonstrate them prior to the end of the session.  Selena demonstrated good  understanding of the education provided. See EMR under Patient Instructions for exercises provided during therapy sessions    ASSESSMENT   Patient reported decreased pain with AROM after MT. Added Pec Stretch to improve shoulder ROM via scapular mobility. Added SwitchedOn: Plank with Dec Making during NMR to improve shoulder motor control.     Selena Is progressing well towards her goals.   Pt prognosis is Excellent.     Pt will continue to benefit from skilled outpatient physical therapy to address the deficits listed in the problem list box on initial evaluation, provide pt/family education and to maximize pt's level of independence in the home and community environment.     Pt's spiritual, cultural and educational needs considered and pt agreeable to plan of care and goals.    Goals:   Short Term Goals:  4 weeks Status  Date Met   PAIN: Pt will report worst pain of 4/10 in order to progress toward max functional ability and improve quality of life. [x] Progressing  [] Met  [] Not Met     FUNCTION: Patient will demonstrate improved function as indicated by a functional score of greater than or  equal to 70 out of 100 on FOTO. [x] Progressing  [] Met  [] Not Met     MOBILITY: Patient will improve AROM to 50% of stated goals, listed in objective measures above, in order to progress towards independence with functional activities.  [x] Progressing  [] Met  [] Not Met     STRENGTH: Patient will improve strength to 50% of stated goals, listed in objective measures above, in order to progress towards independence with functional activities. [x] Progressing  [] Met  [] Not Met     HEP: Patient will demonstrate independence with HEP in order to progress toward functional independence. [x] Progressing  [] Met  [] Not Met        Long Term Goals:  6 weeks Status Date Met   PAIN: Pt will report worst pain of 2/10 in order to progress toward max functional ability and improve quality of life [x] Progressing  [] Met  [] Not Met     FUNCTION: Patient will demonstrate improved function as indicated by a functional score of greater than or equal to 75 out of 100 on FOTO. [x] Progressing  [] Met  [] Not Met     MOBILITY: Patient will improve AROM to stated goals, listed in objective measures above, in order to return to maximal functional potential and improve quality of life. Full, pain-free shoulder range of motion bilaterally [x] Progressing  [] Met  [] Not Met     STRENGTH: Patient will improve strength to stated goals, listed in objective measures above, in order to improve functional independence and quality of life. Goal: 5/5 and pain-free for all shoulder measures bilaterally [x] Progressing  [] Met  [] Not Met     Patient will return to gym activity with less than or equal to 2/10 pain and maximal function.  [x] Progressing  [] Met  [] Not Met         PLAN   Continue Plan of Care (POC) and progress per patient tolerance. See treatment section for details on planned progressions next session.      Ralph Potts, PT, DPT, DPT, SCS

## 2025-04-14 ENCOUNTER — CLINICAL SUPPORT (OUTPATIENT)
Facility: HOSPITAL | Age: 45
End: 2025-04-14
Payer: COMMERCIAL

## 2025-04-14 DIAGNOSIS — M25.611 DECREASED ROM OF RIGHT SHOULDER: Primary | ICD-10-CM

## 2025-04-14 DIAGNOSIS — R29.898 WEAKNESS OF RIGHT SHOULDER: ICD-10-CM

## 2025-04-14 PROCEDURE — 97110 THERAPEUTIC EXERCISES: CPT | Mod: PN | Performed by: PHYSICAL THERAPIST

## 2025-04-14 PROCEDURE — 97140 MANUAL THERAPY 1/> REGIONS: CPT | Mod: PN | Performed by: PHYSICAL THERAPIST

## 2025-04-14 PROCEDURE — 97112 NEUROMUSCULAR REEDUCATION: CPT | Mod: PN | Performed by: PHYSICAL THERAPIST

## 2025-04-15 NOTE — PROGRESS NOTES
"[facility]   Physical Therapy Treatment Note       Patient Name: Selena Spring  MRN: [id number]  YOB: 1980  Encounter Date: 4/14/2025    Therapy Diagnosis:   Encounter Diagnoses   Name Primary?    Decreased ROM of right shoulder Yes    Weakness of right shoulder      Physician: Cachorro Rmairez MD    Physician Orders: Eval and Treat  Medical Diagnosis: Rotator cuff impingement syndrome of right shoulder  Right shoulder pain, unspecified chronicity    Visit # / Visits Authorized:  14 / 12  Insurance Authorization Period: 1/25/2025 to 4/25/2025  Date of Evaluation:  1/25/2025   Plan of Care Certification:  2/19/25 to 5/14/25     Time In: 10:30  Time Out: 11:56  Total Billable Time: 86 minutes    SUBJECTIVE     Pt reports: mild pain today but significantly decreased pain with ADL's recently.  Response to previous treatment: patient reports experiencing a "deep ache" for about 36 hours after last visit but states that she had a significant improvement in ADL's after that.   Functional change: decreased pain with all ADL's.     Pain: 3/10  Location: right upper trap and serratus    OBJECTIVE     Objective Measures updated at progress report unless specified.     Treatment     Selena received the treatments listed below:      Intervention Code  (see below chart) Performed  Today Date/Notes  4/14/25   ART MT X Garrett UT, LS, CE   Gh Mobs MT X  X Post: 30"x3  Inf: 30"x3   PROM MT X IR: 30"x3   Clavicle Mobs MT X All directions and wiggle: 30"x3   MWM: OH Flexion MT X Tried but painful   Dry Needling DN   Infraspinatus, Teres Minor, Supraspinatus, and Deltoid with E-Stim   Astym MT   RUE   UBE  TE X 3 FW/3 BW   Pec Stretch TE X Corner: 30"x3   Serratus Punch NMR X 2#, 3x10   Sidelying ER NMR X 5#, 3x10, B   Prone extension NMR X  X R: 6# 3x10   L: 6#, 3x10   Prone T NMR X  X R: 0#, 2x10, 1# 1x10  L: 2# 3x10   Push-Up NMR X Wall, 2x10   Serratus Wall Slide NMR X Foam roll: 2x10 "   SwitchedOn: Plank w/ Dec Making NMR   3 rounds   ER @ 0 TE X Red, 3x10 - jael   IR @ 0 TE X Blue, 3x10 - jael   Rows - seated NMR   5#, 1x10  7.5# 2x10   Lat Pulldown - seated NMR   10, 7.5#, 3x10   25 minutes of Manual therapy (MT) to improve pain and ROM. (25166)  0 minutes of Dry Needling (DN) to improve pain and ROM.   15 minutes of Therapeutic Exercise (TE) to develop strength, endurance, ROM, and flexibility. (60135)  30 minutes of Neuromuscular Re-Education (NMR)  to improve: Balance, Coordination, Kinesthetic, Sense, Proprioception, and Posture. (45174)        Patient Education and Home Exercises     Home Exercises Provided and Patient Education Provided     Education provided:   - reviewed for understanding.    Written Home Exercises Provided: Patient instructed to cont prior HEP. Exercises were reviewed and Selena was able to demonstrate them prior to the end of the session.  Selena demonstrated good  understanding of the education provided. See EMR under Patient Instructions for exercises provided during therapy sessions    ASSESSMENT   Added Serratus Punch, Push Up on Wall, and Serratus Wall Slides during NMR to improve Serratus Anterior motor control. Attempted MWM: Overhead Flexion during MT but patient was unable to tolerate.     Selena Is progressing well towards her goals.   Pt prognosis is Excellent.     Pt will continue to benefit from skilled outpatient physical therapy to address the deficits listed in the problem list box on initial evaluation, provide pt/family education and to maximize pt's level of independence in the home and community environment.     Pt's spiritual, cultural and educational needs considered and pt agreeable to plan of care and goals.    Goals:   Short Term Goals:  4 weeks Status  Date Met   PAIN: Pt will report worst pain of 4/10 in order to progress toward max functional ability and improve quality of life. [x] Progressing  [] Met  [] Not Met     FUNCTION: Patient will  demonstrate improved function as indicated by a functional score of greater than or equal to 70 out of 100 on FOTO. [x] Progressing  [] Met  [] Not Met     MOBILITY: Patient will improve AROM to 50% of stated goals, listed in objective measures above, in order to progress towards independence with functional activities.  [x] Progressing  [] Met  [] Not Met     STRENGTH: Patient will improve strength to 50% of stated goals, listed in objective measures above, in order to progress towards independence with functional activities. [x] Progressing  [] Met  [] Not Met     HEP: Patient will demonstrate independence with HEP in order to progress toward functional independence. [x] Progressing  [] Met  [] Not Met        Long Term Goals:  6 weeks Status Date Met   PAIN: Pt will report worst pain of 2/10 in order to progress toward max functional ability and improve quality of life [x] Progressing  [] Met  [] Not Met     FUNCTION: Patient will demonstrate improved function as indicated by a functional score of greater than or equal to 75 out of 100 on FOTO. [x] Progressing  [] Met  [] Not Met     MOBILITY: Patient will improve AROM to stated goals, listed in objective measures above, in order to return to maximal functional potential and improve quality of life. Full, pain-free shoulder range of motion bilaterally [x] Progressing  [] Met  [] Not Met     STRENGTH: Patient will improve strength to stated goals, listed in objective measures above, in order to improve functional independence and quality of life. Goal: 5/5 and pain-free for all shoulder measures bilaterally [x] Progressing  [] Met  [] Not Met     Patient will return to gym activity with less than or equal to 2/10 pain and maximal function.  [x] Progressing  [] Met  [] Not Met         PLAN   Continue Plan of Care (POC) and progress per patient tolerance. See treatment section for details on planned progressions next session.      Ralph Potts, PT, DPT, DPT,  SCS

## 2025-04-17 ENCOUNTER — OFFICE VISIT (OUTPATIENT)
Dept: SPORTS MEDICINE | Facility: CLINIC | Age: 45
End: 2025-04-17
Payer: COMMERCIAL

## 2025-04-17 VITALS — WEIGHT: 233.25 LBS | HEIGHT: 66 IN | BODY MASS INDEX: 37.49 KG/M2

## 2025-04-17 DIAGNOSIS — M75.41 ROTATOR CUFF IMPINGEMENT SYNDROME OF RIGHT SHOULDER: Primary | ICD-10-CM

## 2025-04-17 DIAGNOSIS — M12.811 ROTATOR CUFF ARTHROPATHY OF RIGHT SHOULDER: ICD-10-CM

## 2025-04-17 DIAGNOSIS — M19.011 PRIMARY OSTEOARTHRITIS OF RIGHT SHOULDER: ICD-10-CM

## 2025-04-17 DIAGNOSIS — M25.511 RIGHT SHOULDER PAIN, UNSPECIFIED CHRONICITY: ICD-10-CM

## 2025-04-17 PROCEDURE — 4010F ACE/ARB THERAPY RXD/TAKEN: CPT | Mod: CPTII,S$GLB,, | Performed by: ORTHOPAEDIC SURGERY

## 2025-04-17 PROCEDURE — 99999 PR PBB SHADOW E&M-EST. PATIENT-LVL IV: CPT | Mod: PBBFAC,,, | Performed by: ORTHOPAEDIC SURGERY

## 2025-04-17 PROCEDURE — 1159F MED LIST DOCD IN RCRD: CPT | Mod: CPTII,S$GLB,, | Performed by: ORTHOPAEDIC SURGERY

## 2025-04-17 PROCEDURE — 99214 OFFICE O/P EST MOD 30 MIN: CPT | Mod: S$GLB,,, | Performed by: ORTHOPAEDIC SURGERY

## 2025-04-17 PROCEDURE — 3008F BODY MASS INDEX DOCD: CPT | Mod: CPTII,S$GLB,, | Performed by: ORTHOPAEDIC SURGERY

## 2025-04-17 RX ORDER — PROGESTERONE 200 MG/1
200 CAPSULE ORAL NIGHTLY
COMMUNITY

## 2025-04-17 RX ORDER — ESTRADIOL 1 MG/1
1 TABLET ORAL
COMMUNITY

## 2025-04-17 NOTE — PATIENT INSTRUCTIONS
Assessment:  Selena Spring is a RHD 44 y.o. female    with a chief complaint of Pain of the Right Shoulder    8 months Right shoulder subacromial impingement no injury reported   History of previous impingement surgery   Early shoulder and AC arthritis  Concern for rotator cuff arthropathy     Encounter Diagnoses   Name Primary?    Rotator cuff impingement syndrome of right shoulder Yes    Right shoulder pain, unspecified chronicity     Primary osteoarthritis of right shoulder     Rotator cuff arthropathy of right shoulder       Plan:  Ordered MRI of the right shoulder to further assess soft tissue to to continued pain through conservative treatment including injections and physical therapy   Continue physical therapy with Ralph at [facility] 2-3x per week for 6-8 weeks   New order placed today      Follow-up: After imaging or sooner if there are any problems between now and then.    Leave Review:   Google: Leave Google Review  Healthgrades: Leave Healthgrades Review    After Hours Number: [phone]

## 2025-04-17 NOTE — PROGRESS NOTES
Patient ID: Selena Spring  YOB: 1980  MRN: [id number]    Chief Complaint: Pain of the Right Shoulder    Referred By:  Bandar Vargas NP     History of Present Illness: Selena Spring is a  44 y.o. female    with a chief complaint of Pain of the Right Shoulder    Onset: Insidious   Inciting event and date: Chronic right shoulder pain since August of 2024, exacerbated by overhead movements   Physical therapy focused on specific complaint (frequency and duration): Selena is currently in physical therapy at [facility] working with Ralph for 4 months   Injections:R  CSI 8/29/2024     History of Present Illness    CHIEF COMPLAINT:  - Right shoulder pain    HPI:  Selena presents for follow-up regarding persistent right shoulder pain. She reports some improvement in strength and function through PT. However, certain movements and activities continue to cause pain and discomfort. Her right shoulder pain is localized to the posterior aspect during arm-raising movements. She describes a recent incident during PT where a simple arm-raising exercise caused sudden, intense pain. She also notes a second area of pain superior to the shoulder where a palpable knot sometimes forms. Pain is exacerbated by simple tasks such as cutting, painting, and computer use. Despite ongoing issues, she reports some progress. She can now use the computer for longer periods without inflammation. She has undergone PT for about a year, which has led to some improvement. However, she reports no relief from previous injections. Insurance is reportedly refusing to approve more PT visits. She has not had an MRI of the shoulder to date. 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 Ramirez. PT: Ongoing for about a year, has strengthened the shoulder and improved range of motion, but some movements still cause pain and irritation  Shoulder injections: Provided no  relief  Hip injections: Provided no relief    WORK STATUS:  - Works as a   - Job duties include cutting, painting, and program-related activities  - Simple work tasks can irritate the shoulder  - Extended computer use causes some discomfort, but has improved    ROS:  Musculoskeletal: +joint pain, +limb pain, +muscle tension, +pain with movement       Recall HPI from 08/29/2024  Selena presents today with right shoulder pain. She reports a 3-5/10 pain today, but when worsed is a 8-9/10. She reports it increases with overhead movement or when waking up in the morning. She reports chornic shoulder pain and denies any injury. She does report a preivous surgery in 2017 for bone spurs in this shoulder out in [place]. She reports the pain will wake her up at night. She has not had any recent injections or physical therapy. She does take advil as needed for the pain.     Past Medical History:   History reviewed. No pertinent past medical history.  Past Surgical History:   Procedure Laterality Date    INJECTION OF JOINT Right 3/10/2021    Procedure: Right IA Hip Joint injection - [facility] [place];  Surgeon: Renny Vargas MD;  Location: [facility];  Service: Pain Management;  Laterality: Right;     Family History   Problem Relation Name Age of Onset    Breast cancer Mother      Lung cancer Mother      Stroke Mother      Clotting disorder Mother      Prostate cancer Father      Esophageal cancer Father      Clotting disorder Father      Heart disease Paternal Grandmother      Dementia Paternal Grandfather      Breast cancer Paternal Aunt       Social History[1]  Medication List with Changes/Refills   Current Medications    AZELASTINE (ASTELIN) 137 MCG (0.1 %) NASAL SPRAY    1 spray (137 mcg total) by Nasal route 2 (two) times daily.    BUTALBITAL-ACETAMINOPHEN-CAFFEINE -40 MG (FIORICET, ESGIC) -40 MG PER TABLET    Take 1 tablet by mouth every 12 (twelve) hours as needed for Headaches (Migraines.).     CLINDAMYCIN-BENZOYL PEROXIDE (BENZACLIN) GEL    Apply topically 2 (two) times daily.    DESLORATADINE (CLARINEX) 5 MG TABLET    Take 1 tablet by mouth nightly as needed.    DEXTROMETHORPHAN-BUPROPION (AUVELITY)  MG TBIE    Take by mouth.    ESTRADIOL (ESTRACE) 1 MG TABLET    Take 1 mg by mouth.    GABAPENTIN (NEURONTIN) 600 MG TABLET        HYDROXYZINE PAMOATE (VISTARIL) 25 MG CAP    TAKE 1 TO 2 CAPSULES BY MOUTH TWICE DAILY AS NEEDED    LAMOTRIGINE (LAMICTAL) 200 MG TABLET        LOSARTAN (COZAAR) 50 MG TABLET    Take 1 tablet (50 mg total) by mouth once daily.    MONTELUKAST (SINGULAIR) 10 MG TABLET    Take 1 tablet by mouth every evening.    NORETHINDRONE-ETHINYL ESTRADIOL (MICROGESTIN 1/20) 1-20 MG-MCG PER TABLET    Take 1 tablet by mouth once daily. Take continuously, avoiding menses    OMEPRAZOLE (PRILOSEC) 20 MG CAPSULE        PRAZOSIN (MINIPRESS) 1 MG CAP    Take 2 mg by mouth every evening.    PROGESTERONE (PROMETRIUM) 200 MG CAPSULE    Take 200 mg by mouth every evening.    SERTRALINE (ZOLOFT) 100 MG TABLET        TIZANIDINE (ZANAFLEX) 4 MG TABLET        TRAMADOL (ULTRAM) 50 MG TABLET    Take 50 mg by mouth every 6 (six) hours as needed.    TRIAMCINOLONE ACETONIDE 0.1% (KENALOG) 0.1 % CREAM        UREA (CARMOL) 40 % CREA    Apply topically once daily.     Review of patient's allergies indicates:   Allergen Reactions    Clonazepam Other (See Comments)     Suicidal thoughts    Sulfa (sulfonamide antibiotics) Hives     ROS    Physical Exam:   Body mass index is 37.65 kg/m².  There were no vitals filed for this visit.   GENERAL: Well appearing, appropriate for stated age, no acute distress.  CARDIOVASCULAR: Pulses regular by peripheral palpation.  PULMONARY: Respirations are even and non-labored.  NEURO: Awake, alert, and oriented x 3.  PSYCH: Mood & affect are appropriate.  HEENT: Head is normocephalic and atraumatic.          Right Shoulder Exam     Tenderness   The patient is tender to palpation of  the supraspinatus and acromioclavicular joint.    Range of Motion   Active abduction:  120 (w/ pain)   Forward Flexion:  150 (w/ pain)   External Rotation 0 degrees:  40     Tests & Signs   Medina test: positive  Active Compression Test ([place]'s Sign): positive    Other   Sensation: normal    Left Shoulder Exam     Other   Sensation: normal       Muscle Strength   Right Upper Extremity   Shoulder Abduction: 5/5   Shoulder Internal Rotation: 5/5   Shoulder External Rotation: 5/5   Supraspinatus: 4/5   Subscapularis: 5/5   Biceps: 5/5   Left Upper Extremity  Shoulder Abduction: 5/5   Shoulder Internal Rotation: 5/5   Shoulder External Rotation: 5/5   Supraspinatus: 5/5   Subscapularis: 5/5   Biceps: 5/5     Vascular Exam     Right Pulses      Radial:                    2+      Left Pulses      Radial:                    2+      Physical Exam                Imaging:    Echo    Left Ventricle: The left ventricle is normal in size. Normal wall   thickness. There is concentric remodeling. Normal wall motion. There is   normal systolic function. Ejection fraction is approximately 65%. There is   normal diastolic function.    Right Ventricle: Normal right ventricular cavity size. Right ventricle   wall motion  is normal. Systolic function is normal.    Tricuspid Valve: There is mild regurgitation.    Pulmonary Artery: The estimated pulmonary artery systolic pressure is   32 mmHg.    IVC/SVC: Normal venous pressure at 3 mmHg.      Relevant imaging results reviewed and interpreted by me, discussed with the patient and / or family today.     Other Tests:     Assessment & Plan    PLAN SUMMARY:   MRI of Right Shoulder ordered to evaluate ongoing pain and guide treatment.   Follow-up after MRI completion.    RIGHT SHOULDER PAIN:   Ordered MRI Right Shoulder to evaluate ongoing pain and guide treatment.    FOLLOW-UP:   Follow up after MRI is completed.         Patient Instructions   Assessment:  Selena Spring is a RHD 44  stephanieo. female    with a chief complaint of Pain of the Right Shoulder    8 months Right shoulder subacromial impingement no injury reported   History of previous impingement surgery   Early shoulder and AC arthritis  Concern for rotator cuff arthropathy     Encounter Diagnoses   Name Primary?    Rotator cuff impingement syndrome of right shoulder Yes    Right shoulder pain, unspecified chronicity     Primary osteoarthritis of right shoulder     Rotator cuff arthropathy of right shoulder       Plan:  Ordered MRI of the right shoulder to further assess soft tissue to to continued pain through conservative treatment including injections and physical therapy   Continue physical therapy with Ralph at [facility] 2-3x per week for 6-8 weeks   New order placed today      Follow-up: After imaging or sooner if there are any problems between now and then.    Leave Review:   Google: Leave Google Review  Healthgrades: Leave Healthgrades Review    After Hours Number: [phone]         Provider Note/Medical Decision Making:     I discussed worrisome and red flag signs and symptoms with the patient. The patient expressed understanding and agreed to alert me immediately or to go to the emergency room if they experience any of these. Treatment plan was developed with input from the patient/family, and they expressed understanding and agreement with the plan. All questions were answered today.          Cachorro Ramirez MD  Board Certified in Orthopaedic Surgery & Sports Medicine   Regional Section Head of Orthopedic Surgery & Sports Medicine  [facility]      Disclaimer: This note was prepared using a voice recognition system and is likely to have sound alike errors within the text.     This note was generated with the assistance of ambient listening technology. Verbal consent was obtained by the patient and accompanying visitor(s) for the recording  of patient appointment to facilitate this note. I attest to having reviewed and edited the generated note for accuracy, though some syntax or spelling errors may persist. Please contact the author of this note for any clarification.           [1]   Social History  Socioeconomic History    Marital status: Single   Tobacco Use    Smoking status: Former     Current packs/day: 0.00     Types: Cigarettes     Quit date: 2015     Years since quitting: 10.3    Smokeless tobacco: Never   Substance and Sexual Activity    Alcohol use: Never    Drug use: Never     Social Drivers of Health     Financial Resource Strain: Low Risk  (8/29/2024)    Overall Financial Resource Strain (CARDIA)     Difficulty of Paying Living Expenses: Not hard at all   Food Insecurity: No Food Insecurity (8/29/2024)    Hunger Vital Sign     Worried About Running Out of Food in the Last Year: Never true     Ran Out of Food in the Last Year: Never true   Physical Activity: Insufficiently Active (8/29/2024)    Exercise Vital Sign     Days of Exercise per Week: 4 days     Minutes of Exercise per Session: 30 min   Stress: Stress Concern Present (8/29/2024)    [place] [employer] - Occupational Stress Questionnaire     Feeling of Stress : Rather much   Housing Stability: Unknown (8/29/2024)    Housing Stability Vital Sign     Unable to Pay for Housing in the Last Year: No

## 2025-04-21 ENCOUNTER — PATIENT MESSAGE (OUTPATIENT)
Dept: CARDIOLOGY | Facility: CLINIC | Age: 45
End: 2025-04-21
Payer: COMMERCIAL

## 2025-04-22 RX ORDER — LOSARTAN POTASSIUM 100 MG/1
100 TABLET ORAL DAILY
Qty: 90 TABLET | Refills: 3 | Status: SHIPPED | OUTPATIENT
Start: 2025-04-22 | End: 2026-04-22

## 2025-04-25 ENCOUNTER — HOSPITAL ENCOUNTER (OUTPATIENT)
Dept: RADIOLOGY | Facility: HOSPITAL | Age: 45
Discharge: HOME | End: 2025-04-25
Attending: ORTHOPAEDIC SURGERY
Payer: COMMERCIAL

## 2025-04-25 DIAGNOSIS — M75.41 ROTATOR CUFF IMPINGEMENT SYNDROME OF RIGHT SHOULDER: ICD-10-CM

## 2025-04-25 PROCEDURE — 73221 MRI JOINT UPR EXTREM W/O DYE: CPT | Mod: 26,RT,, | Performed by: RADIOLOGY

## 2025-04-25 PROCEDURE — 73221 MRI JOINT UPR EXTREM W/O DYE: CPT | Mod: TC,RT

## 2025-05-12 ENCOUNTER — OFFICE VISIT (OUTPATIENT)
Dept: SPORTS MEDICINE | Facility: CLINIC | Age: 45
End: 2025-05-12
Payer: COMMERCIAL

## 2025-05-12 VITALS — HEIGHT: 67 IN | BODY MASS INDEX: 36.88 KG/M2 | WEIGHT: 235 LBS

## 2025-05-12 DIAGNOSIS — M75.91 SUPRASPINATUS TENDINITIS, RIGHT: ICD-10-CM

## 2025-05-12 DIAGNOSIS — M19.011 PRIMARY OSTEOARTHRITIS OF RIGHT SHOULDER: Primary | ICD-10-CM

## 2025-05-12 DIAGNOSIS — M75.81 INFRASPINATUS TENDINITIS, RIGHT: ICD-10-CM

## 2025-05-12 PROCEDURE — 3008F BODY MASS INDEX DOCD: CPT | Mod: CPTII,S$GLB,, | Performed by: ORTHOPAEDIC SURGERY

## 2025-05-12 PROCEDURE — 99214 OFFICE O/P EST MOD 30 MIN: CPT | Mod: S$GLB,,, | Performed by: ORTHOPAEDIC SURGERY

## 2025-05-12 PROCEDURE — 1159F MED LIST DOCD IN RCRD: CPT | Mod: CPTII,S$GLB,, | Performed by: ORTHOPAEDIC SURGERY

## 2025-05-12 PROCEDURE — 99999 PR PBB SHADOW E&M-EST. PATIENT-LVL IV: CPT | Mod: PBBFAC,,, | Performed by: ORTHOPAEDIC SURGERY

## 2025-05-12 PROCEDURE — 4010F ACE/ARB THERAPY RXD/TAKEN: CPT | Mod: CPTII,S$GLB,, | Performed by: ORTHOPAEDIC SURGERY

## 2025-05-12 NOTE — PATIENT INSTRUCTIONS
Assessment:  Selena Spring is a RHD 44 y.o. female    with a chief complaint of Pain of the Right Shoulder    8 months Right shoulder subacromial impingement no injury reported   History of previous impingement surgery   Early shoulder and AC arthritis  Supraspinatus tendinosis   Infraspinatus tendinosis     Encounter Diagnoses   Name Primary?    Primary osteoarthritis of right shoulder Yes    Supraspinatus tendinitis, right     Infraspinatus tendinitis, right         Plan:  Reviewed MRI with the patient today. Her MRI shows supraspinatus and infraspinatus tendinosis   Discussed non-operative treatment options in the form of rest, activity modifications, oral anti-inflammatories, corticosteroid injections, and physical therapy/physician directed home exercise program.   Continue physical therapy with Ralph at [facility] 2-3x per week for 6-8 weeks       Follow-up: As needed or sooner if there are any problems between now and then.    Leave Review:   Google: Leave Google Review  Healthgrades: Leave Healthgrades Review    After Hours Number: [phone]     Although there is not a cure for arthritis, there are effective ways to improve symptoms.     Prescriptions & Medications:  I do recommend formal physical therapy or at minimum a home exercise program.   Over the counter analgesic (pain-relieving) medications can help. Examples are Tylenol, Ibuprofen, and Aleve. Check with your primary care physician to make sure you don't have contra-indications to taking those medicines.  Some over the counter supplement solutions such as glucosamine and chondroitin may help with symptoms, although the evidence is mixed.    Healthy Lifestyle:  Excess body weight can have a negative impact on joint health and on pain. I recommend healthy lifestyle choices including nutrition and exercise that help reach and maintain an ideal body weight. Tips for Exercise:  https://www.Exoprise.RateElert/13-exercise-tips-for-a-healthier-you/  Some diets cause increased inflammation. I recommend a balanced wholesome diet including some foods such as olives that are shown to decrease inflammation. More diet information available here: https://www.Eat Latin/8-best-foods-for-knee-arthritis/

## 2025-05-12 NOTE — PROGRESS NOTES
Patient ID: Selena Spring  YOB: 1980  MRN: [id number]    Chief Complaint: Pain of the Right Shoulder    Referred By: Bandar Vargas NP     History of Present Illness: Selena Spring is a  44 y.o. female    with a chief complaint of Pain of the Right Shoulder    Onset: Insidious   Inciting event and date: Chronic right shoulder pain since August of 2024, exacerbated by overhead movements   Physical therapy focused on specific complaint (frequency and duration): Selena is currently in physical therapy at [facility] working with Ralph for 4 months   Injections:R SA CSI 8/29/2024     History of Present Illness    CHIEF COMPLAINT:  - Right shoulder pain and dysfunction.    HPI:  Selena presents for follow up of shoulder pain. A week ago, pain was at 8/10 severity, starting after attempting to lift a 5-pound weight overhead and carrying a large box into work within a six-hour period. She experienced a sudden onset of intense pain spreading throughout the shoulder area, persisting for days and interfering with sleep due to movement. She uses a home massager to alleviate discomfort, targeting specific tender spots.  She identifies several areas of tenderness, including the top edge of the shoulder, an area within the shoulder, and occasional tender spots on the back of the shoulder. Pain worsens with holding objects away from the body, pushing, and overhead movements. Activities may not cause immediate pain, but symptoms develop hours later or upon waking the next day. She recounts an incident where stapling for a bulletin board led to excruciating pain the following morning, lasting for days. She reports doing therapy exercises at home and in the gym, noting some progress in certain movements. She mentions a previous procedure where a bone spur was shaved off, though the exact timing is not specified.    ROS:  Constitutional: +pain with movement, +sleep difficulty  Allergic:  +allergic reactions       Recall HPI from 04/17/2025  Selena presents for follow-up regarding persistent right shoulder pain. She reports some improvement in strength and function through PT. However, certain movements and activities continue to cause pain and discomfort. Her right shoulder pain is localized to the posterior aspect during arm-raising movements. She describes a recent incident during PT where a simple arm-raising exercise caused sudden, intense pain. She also notes a second area of pain superior to the shoulder where a palpable knot sometimes forms. Pain is exacerbated by simple tasks such as cutting, painting, and computer use. Despite ongoing issues, she reports some progress. She can now use the computer for longer periods without inflammation. She has undergone PT for about a year, which has led to some improvement. However, she reports no relief from previous injections. Insurance is reportedly refusing to approve more PT visits. She has not had an MRI of the shoulder to date. 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 Ramirez. PT: Ongoing for about a year, has strengthened the shoulder and improved range of motion, but some movements still cause pain and irritation  Shoulder injections: Provided no relief  Hip injections: Provided no relief    Past Medical History:   History reviewed. No pertinent past medical history.  Past Surgical History:   Procedure Laterality Date    INJECTION OF JOINT Right 3/10/2021    Procedure: Right IA Hip Joint injection - per orthopedics [place];  Surgeon: Renny Vargas MD;  Location: [facility];  Service: Pain Management;  Laterality: Right;     Family History   Problem Relation Name Age of Onset    Breast cancer Mother      Lung cancer Mother      Stroke Mother      Clotting disorder Mother      Prostate cancer Father      Esophageal cancer Father      Clotting disorder Father      Heart disease Paternal Grandmother       Dementia Paternal Grandfather      Breast cancer Paternal Aunt       Social History[1]  Medication List with Changes/Refills   Current Medications    AZELASTINE (ASTELIN) 137 MCG (0.1 %) NASAL SPRAY    1 spray (137 mcg total) by Nasal route 2 (two) times daily.    BUTALBITAL-ACETAMINOPHEN-CAFFEINE -40 MG (FIORICET, ESGIC) -40 MG PER TABLET    Take 1 tablet by mouth every 12 (twelve) hours as needed for Headaches (Migraines.).    CLINDAMYCIN-BENZOYL PEROXIDE (BENZACLIN) GEL    Apply topically 2 (two) times daily.    DESLORATADINE (CLARINEX) 5 MG TABLET    Take 1 tablet by mouth nightly as needed.    DEXTROMETHORPHAN-BUPROPION (AUVELITY)  MG TBIE    Take by mouth.    ESTRADIOL (ESTRACE) 1 MG TABLET    Take 1 mg by mouth.    GABAPENTIN (NEURONTIN) 600 MG TABLET        HYDROXYZINE PAMOATE (VISTARIL) 25 MG CAP    TAKE 1 TO 2 CAPSULES BY MOUTH TWICE DAILY AS NEEDED    LAMOTRIGINE (LAMICTAL) 200 MG TABLET        LOSARTAN (COZAAR) 100 MG TABLET    Take 1 tablet (100 mg total) by mouth once daily.    MONTELUKAST (SINGULAIR) 10 MG TABLET    Take 1 tablet by mouth every evening.    NORETHINDRONE-ETHINYL ESTRADIOL (MICROGESTIN 1/20) 1-20 MG-MCG PER TABLET    Take 1 tablet by mouth once daily. Take continuously, avoiding menses    OMEPRAZOLE (PRILOSEC) 20 MG CAPSULE        PRAZOSIN (MINIPRESS) 1 MG CAP    Take 2 mg by mouth every evening.    PROGESTERONE (PROMETRIUM) 200 MG CAPSULE    Take 200 mg by mouth every evening.    SERTRALINE (ZOLOFT) 100 MG TABLET        TIZANIDINE (ZANAFLEX) 4 MG TABLET        TRAMADOL (ULTRAM) 50 MG TABLET    Take 50 mg by mouth every 6 (six) hours as needed.    TRIAMCINOLONE ACETONIDE 0.1% (KENALOG) 0.1 % CREAM        UREA (CARMOL) 40 % CREA    Apply topically once daily.     Review of patient's allergies indicates:   Allergen Reactions    Clonazepam Other (See Comments)     Suicidal thoughts    Sulfa (sulfonamide antibiotics) Hives     ROS    Physical Exam:   Body mass index is  36.81 kg/m².  There were no vitals filed for this visit.   GENERAL: Well appearing, appropriate for stated age, no acute distress.  CARDIOVASCULAR: Pulses regular by peripheral palpation.  PULMONARY: Respirations are even and non-labored.  NEURO: Awake, alert, and oriented x 3.  PSYCH: Mood & affect are appropriate.  HEENT: Head is normocephalic and atraumatic.          Back (L-Spine & T-Spine) / Neck (C-Spine) Exam     Tenderness   The patient is tender to palpation of the right trapezial and right scapular.   Right Shoulder Exam     Tenderness   The patient is tender to palpation of the acromioclavicular joint.    Range of Motion   Active abduction:  normal   Passive abduction:  normal   Extension:  normal   Forward Flexion:  normal   Forward Elevation: normal  Adduction: normal  External Rotation 0 degrees:  normal   Internal rotation 0 degrees:  normal     Tests & Signs   Active Compression Test (Rich's Sign): positive    Other   Sensation: normal    Left Shoulder Exam     Range of Motion   Active abduction:  normal   Passive abduction:  normal   Extension:  normal   Forward Flexion:  normal   Forward Elevation: normal  Adduction: normal  External Rotation 0 degrees:  normal   Internal rotation 0 degrees:  normal     Other   Sensation: normal       Muscle Strength   Right Upper Extremity   Shoulder Abduction: 5/5   Shoulder Internal Rotation: 5/5   Shoulder External Rotation: 4/5   Supraspinatus: 5/5   Subscapularis: 5/5   Biceps: 5/5   Left Upper Extremity  Shoulder Abduction: 5/5   Shoulder Internal Rotation: 5/5   Shoulder External Rotation: 5/5   Supraspinatus: 5/5   Subscapularis: 5/5   Biceps: 5/5     Vascular Exam     Right Pulses      Radial:                    2+      Left Pulses      Radial:                    2+      Physical Exam    IMAGING:  - MRI Shoulder: Partial thickness tears in the rotator cuff muscle, Tendinopathy, Arthritis in the AC joint           Imaging:    MRI Shoulder Without  Contrast Right  Narrative: EXAM: MRI SHOULDER WITHOUT CONTRAST RIGHT    CLINICAL HISTORY:  Chronic left shoulder pain and instability.    COMPARISON: None.    TECHNIQUE: Standard multiplanar pulse sequences obtained without IV or intra-articular contrast.    FINDINGS:    Mild supraspinatus and infraspinatus tendinosis.  Subscapularis and teres minor tendons are normal.  Normal rotator cuff muscle bulk.    Prior subacromial/subdeltoid and AC joint decompression.  Negative for complicating process.  No significant fluid within the subacromial/subdeltoid bursa.    Biceps tendon and biceps-labral complex are normal.  The labrum is normal in appearance.  No evidence of labral tear.  Articular cartilage is well preserved.    Glenohumeral ligaments are normal.  Normal, physiologic amount of joint fluid.  Negative for loose body.  Bony architecture and marrow signal are normal.  Supporting soft tissues and musculature are unremarkable.  Impression: Mild supraspinatus and infraspinatus tendinosis.  Negative for significant partial or full-thickness tear.    Prior subacromial/subdeltoid and AC joint decompression.    Otherwise unremarkable MRI right shoulder.    Finalized on: 4/28/2025 11:24 AM By:  Riley Godinez MD  [facility]# [id number]      2025-04-28 11:26:[id number]     [facility]      Relevant imaging results reviewed and interpreted by me, discussed with the patient and / or family today.     Other Tests:     Assessment & Plan    PLAN SUMMARY:   Focus on anti-inflammatory diet   Continue deep massage for inflammation management   Considered but decided against AC joint injection   Follow up if needed          Patient Instructions   Assessment:  Selena Spring is a RHD 44 y.o. female    with a chief complaint of Pain of the Right Shoulder    8 months Right shoulder subacromial impingement no injury reported   History of previous impingement surgery   Early shoulder and AC arthritis  Supraspinatus tendinosis    Infraspinatus tendinosis     Encounter Diagnoses   Name Primary?    Primary osteoarthritis of right shoulder Yes    Supraspinatus tendinitis, right     Infraspinatus tendinitis, right         Plan:  Reviewed MRI with the patient today. Her MRI shows supraspinatus and infraspinatus tendinosis   Discussed non-operative treatment options in the form of rest, activity modifications, oral anti-inflammatories, corticosteroid injections, and physical therapy/physician directed home exercise program.   Continue physical therapy with Ralph at [facility] 2-3x per week for 6-8 weeks       Follow-up: As needed or sooner if there are any problems between now and then.    Leave Review:   Google: Leave Google Review  Healthgrades: Leave Healthgrades Review    After Hours Number: [phone]     Although there is not a cure for arthritis, there are effective ways to improve symptoms.     Prescriptions & Medications:  I do recommend formal physical therapy or at minimum a home exercise program.   Over the counter analgesic (pain-relieving) medications can help. Examples are Tylenol, Ibuprofen, and Aleve. Check with your primary care physician to make sure you don't have contra-indications to taking those medicines.  Some over the counter supplement solutions such as glucosamine and chondroitin may help with symptoms, although the evidence is mixed.    Healthy Lifestyle:  Excess body weight can have a negative impact on joint health and on pain. I recommend healthy lifestyle choices including nutrition and exercise that help reach and maintain an ideal body weight. Tips for Exercise: https://www.[email or web address]-exercise-tips-for-a-healthier-you/  Some diets cause increased inflammation. I recommend a balanced wholesome diet including some foods such as olives that are shown to decrease inflammation. More diet information available here: https://www.[email or web address]-best-foods-for-knee-arthritis/       Provider  Note/Medical Decision Making:     I discussed worrisome and red flag signs and symptoms with the patient. The patient expressed understanding and agreed to alert me immediately or to go to the emergency room if they experience any of these. Treatment plan was developed with input from the patient/family, and they expressed understanding and agreement with the plan. All questions were answered today.          Cachorro Ramirez MD  Board Certified in Orthopaedic Surgery & Sports Medicine   [facility] Head of Orthopedic Surgery & Sports Medicine  [facility]      Disclaimer: This note was prepared using a voice recognition system and is likely to have sound alike errors within the text.     This note was generated with the assistance of ambient listening technology. Verbal consent was obtained by the patient and accompanying visitor(s) for the recording of patient appointment to facilitate this note. I attest to having reviewed and edited the generated note for accuracy, though some syntax or spelling errors may persist. Please contact the author of this note for any clarification.           [1]   Social History  Socioeconomic History    Marital status: Single   Tobacco Use    Smoking status: Former     Current packs/day: 0.00     Types: Cigarettes     Quit date: 2015     Years since quitting: 10.3    Smokeless tobacco: Never   Substance and Sexual Activity    Alcohol use: Never    Drug use: Never     Social Drivers of Health     Financial Resource Strain: Low Risk  (8/29/2024)    Overall Financial Resource Strain (CARDIA)     Difficulty of Paying Living Expenses: Not hard at all   Food Insecurity: No Food Insecurity (8/29/2024)    Hunger Vital Sign     Worried About Running Out of Food in the Last Year: Never true     Ran Out of Food in the Last Year: Never true   Physical Activity: Insufficiently Active (8/29/2024)    Exercise Vital Sign      Days of Exercise per Week: 4 days     Minutes of Exercise per Session: 30 min   Stress: Stress Concern Present (8/29/2024)    [employer] - Occupational Stress Questionnaire     Feeling of Stress : Rather much   Housing Stability: Unknown (8/29/2024)    Housing Stability Vital Sign     Unable to Pay for Housing in the Last Year: No

## 2025-05-23 ENCOUNTER — CLINICAL SUPPORT (OUTPATIENT)
Facility: HOSPITAL | Age: 45
End: 2025-05-23
Payer: COMMERCIAL

## 2025-05-23 DIAGNOSIS — R29.898 WEAKNESS OF RIGHT SHOULDER: ICD-10-CM

## 2025-05-23 DIAGNOSIS — M25.611 DECREASED ROM OF RIGHT SHOULDER: Primary | ICD-10-CM

## 2025-05-23 PROCEDURE — 97140 MANUAL THERAPY 1/> REGIONS: CPT | Mod: PN | Performed by: PHYSICAL THERAPIST

## 2025-05-23 PROCEDURE — 97112 NEUROMUSCULAR REEDUCATION: CPT | Mod: PN | Performed by: PHYSICAL THERAPIST

## 2025-05-23 PROCEDURE — 97110 THERAPEUTIC EXERCISES: CPT | Mod: PN | Performed by: PHYSICAL THERAPIST

## 2025-05-23 NOTE — PROGRESS NOTES
[facility]   Physical Therapy Treatment Note + Plan of Care      Patient Name: Selena Spring  MRN: [id number]  YOB: 1980  Encounter Date: 5/23/2025    Therapy Diagnosis:   Encounter Diagnoses   Name Primary?    Decreased ROM of right shoulder Yes    Weakness of right shoulder      Physician: Cachorro Ramirez MD    Physician Orders: Eval and Treat  Medical Diagnosis: Rotator cuff impingement syndrome of right shoulder  Rotator cuff arthropathy of right shoulder    Visit # / Visits Authorized:  1 / 20  Insurance Authorization Period: 5/12/2025 to 12/31/2025  Date of Evaluation:  1/25/2025   Plan of Care Certification: 5/23/25 to 8/1/25    Time In: 10:30  Time Out: 11:56  Total Billable Time: 75 minutes    SUBJECTIVE     Pt reports: overall decreased frequency of pain, improved flexibility, and improved strength but continued pain with repetitive tasks, overhead activities, and holding/carrying things away from her body.     Pain: 3/10  Location: right upper trap and serratus    OBJECTIVE     RANGE OF MOTION:  *denotes pain     Cervical ROM  (Degrees) Right    Left    Goal   Cervical Side Bending  45 45 45   Cervical Rotation  90 80 90     Shoulder ROM  (Degrees) Right Left Goal   Functional Internal Rotation  Dysfunctional Painful  Funcitonal Nonpainful  Funcitonal Nonpainful    Function External Rotation  Functional Painful  Funcitonal Nonpainful  Funcitonal Nonpainful          STRENGTH:  *denotes pain     MMT Right Left Goal   Infraspinatus  4+/5* 4+/5 5/5   Subscapularis  5/5* 5/5* 5/5   Supraspinatus  5/5* 5/5 5/5   Teres Minor 4+/5 4+/5* 5/5   Rhomboid NT NT 5/5   Lower Trapezius  NT NT 5/5   Latissimus Dorsi NT NT 5/5         FUNCTION:     Intake Outcome Measure for FOTO Shoulder Survey    Therapist reviewed FOTO scores for Selena Spring on 9/9/2024.   FOTO report - see Media section or FOTO account episode details.    Intake Score: 62%         Treatment  "    Selena received the treatments listed below:    Intervention Code  (see below chart) Performed  Today Date/Notes  5/12/25   Gh Mobs MT X  X Post: 30"x3  Inf: 30"x3   PROM MT X IR: 30"x3   UBE  TE X 3 FW/3 BW   Pec Stretch TE X Corner: 30"x3   Foam Roll Series    X     Serratus Punch NMR X 2#, 3x10   Sidelying ER NMR   5#, 3x10, B   Prone extension (bench) NMR X  X R: 6# 3x10   L: 6#, 3x10   Prone Row (bench) NMR X 2x10   ER @ 0 TE X Green, 3x10 - jael   IR @ 0 TE X Blue, 3x10 - jael   Dynamic Hug NMR X Green band, 2x10   ER Holds  NMR X 3x20"   Lat Pulls  NMR X 0#, 2x10   Single Arm Low Cable Row NMR X 5#, 2x10   10 minutes of Manual therapy (MT) to improve pain and ROM. (57107)  30 minutes of Therapeutic Exercise (TE) to develop strength, endurance, ROM, and flexibility. (86252)  30 minutes of Neuromuscular Re-Education (NMR)  to improve: Balance, Coordination, Kinesthetic, Sense, Proprioception, and Posture. (08842)        Patient Education and Home Exercises     Home Exercises Provided and Patient Education Provided     Education provided:   - reviewed for understanding.    Written Home Exercises Provided: Patient instructed to cont prior HEP. Exercises were reviewed and Selena was able to demonstrate them prior to the end of the session.  Selena demonstrated good  understanding of the education provided. See EMR under Patient Instructions for exercises provided during therapy sessions    ASSESSMENT   Updated goals, measurements, and FOTO for POC Note. Added multiple exercises during NMR to improve shoulder motor control.     Patient is progressing well with shoulder pain, ROM, and strength; however, patient remains limited with pain with daily activities and shoulder strength.  Patient will benefit from continued skilled therapy address remaining limitations.        Selena Is progressing well towards her goals.   Pt prognosis is Excellent.     Pt will continue to benefit from skilled outpatient physical " therapy to address the deficits listed in the problem list box on initial evaluation, provide pt/family education and to maximize pt's level of independence in the home and community environment.     Pt's spiritual, cultural and educational needs considered and pt agreeable to plan of care and goals.    Goals:   Short Term Goals:  4 weeks Status  Date Met   PAIN: Pt will report worst pain of 4/10 in order to progress toward max functional ability and improve quality of life. [x] Progressing  [] Met  [] Not Met     FUNCTION: Patient will demonstrate improved function as indicated by a functional score of greater than or equal to 70 out of 100 on FOTO. [x] Progressing  [] Met  [] Not Met     MOBILITY: Patient will improve AROM to 50% of stated goals, listed in objective measures above, in order to progress towards independence with functional activities.  [x] Progressing  [] Met  [] Not Met     STRENGTH: Patient will improve strength to 50% of stated goals, listed in objective measures above, in order to progress towards independence with functional activities. [x] Progressing  [] Met  [] Not Met     HEP: Patient will demonstrate independence with HEP in order to progress toward functional independence. [x] Progressing  [] Met  [] Not Met        Long Term Goals:  6 weeks Status Date Met   PAIN: Pt will report worst pain of 2/10 in order to progress toward max functional ability and improve quality of life [x] Progressing  [] Met  [] Not Met     FUNCTION: Patient will demonstrate improved function as indicated by a functional score of greater than or equal to 75 out of 100 on FOTO. [x] Progressing  [] Met  [] Not Met     MOBILITY: Patient will improve AROM to stated goals, listed in objective measures above, in order to return to maximal functional potential and improve quality of life. Full, pain-free shoulder range of motion bilaterally [x] Progressing  [] Met  [] Not Met     STRENGTH: Patient will improve strength  to stated goals, listed in objective measures above, in order to improve functional independence and quality of life. Goal: 5/5 and pain-free for all shoulder measures bilaterally [x] Progressing  [] Met  [] Not Met     Patient will return to gym activity with less than or equal to 2/10 pain and maximal function.  [x] Progressing  [] Met  [] Not Met         PLAN   Updated Plan of Care: 2 times per week for 10 weeks to include Therapeutic Exercise, Neuromuscular Reeducation, Therapeutic Activities, and Manual Therapy.        Ralph Potts, PT, DPT, DPT, SCS

## 2025-05-26 ENCOUNTER — CLINICAL SUPPORT (OUTPATIENT)
Facility: HOSPITAL | Age: 45
End: 2025-05-26
Payer: COMMERCIAL

## 2025-05-26 DIAGNOSIS — R29.898 WEAKNESS OF RIGHT SHOULDER: ICD-10-CM

## 2025-05-26 DIAGNOSIS — M25.611 DECREASED ROM OF RIGHT SHOULDER: Primary | ICD-10-CM

## 2025-05-26 PROCEDURE — 97140 MANUAL THERAPY 1/> REGIONS: CPT | Mod: PN | Performed by: PHYSICAL THERAPIST

## 2025-05-26 PROCEDURE — 97112 NEUROMUSCULAR REEDUCATION: CPT | Mod: PN | Performed by: PHYSICAL THERAPIST

## 2025-05-26 PROCEDURE — 97110 THERAPEUTIC EXERCISES: CPT | Mod: PN | Performed by: PHYSICAL THERAPIST

## 2025-05-26 NOTE — PROGRESS NOTES
[facility]   Physical Therapy Treatment Note      Patient Name: Selena Spring  MRN: [id number]  YOB: 1980  Encounter Date: 5/26/2025    Therapy Diagnosis:   Encounter Diagnoses   Name Primary?    Decreased ROM of right shoulder Yes    Weakness of right shoulder      Physician: Cachorro Ramirez MD    Physician Orders: Eval and Treat  Medical Diagnosis: Rotator cuff impingement syndrome of right shoulder  Rotator cuff arthropathy of right shoulder    Visit # / Visits Authorized:  2 / 20  Insurance Authorization Period: 5/12/2025 to 12/31/2025  Date of Evaluation:  1/25/2025   Plan of Care Certification: 5/23/25 to 8/1/25    Time In: 11:00  Time Out: 12:05  Total Billable Time: 65 minutes    SUBJECTIVE     Pt reports: overall decreased frequency of pain, improved flexibility, and improved strength but continued pain with repetitive tasks, overhead activities, and holding/carrying things away from her body.     Pain: 3/10  Location: right upper trap and serratus    OBJECTIVE     RANGE OF MOTION:  *denotes pain     Cervical ROM  (Degrees) Right    Left    Goal   Cervical Side Bending  45 45 45   Cervical Rotation  90 80 90     Shoulder ROM  (Degrees) Right Left Goal   Functional Internal Rotation  Dysfunctional Painful  Funcitonal Nonpainful  Funcitonal Nonpainful    Function External Rotation  Functional Painful  Funcitonal Nonpainful  Funcitonal Nonpainful          STRENGTH:  *denotes pain     MMT Right Left Goal   Infraspinatus  4+/5* 4+/5 5/5   Subscapularis  5/5* 5/5* 5/5   Supraspinatus  5/5* 5/5 5/5   Teres Minor 4+/5 4+/5* 5/5   Rhomboid NT NT 5/5   Lower Trapezius  NT NT 5/5   Latissimus Dorsi NT NT 5/5         FUNCTION:     Intake Outcome Measure for FOTO Shoulder Survey    Therapist reviewed FOTO scores for Selena Spring on 9/9/2024.   FOTO report - see Media section or FOTO account episode details.    Intake Score: 62%         Treatment     Selena  "received the treatments listed below:    Intervention Code  (see below chart) Performed  Today Date/Notes  5/26/25   ART MT   Garrett UT, LS, CE   GH Mobs MT X  X Post: 30"x3  Inf: 30"x3   UBE  TE X 3 FW/3 BW   Pec Stretch TE X Corner: 30"x3   Foam Roll Series    X     Serratus Wall Slide NMR X Foam roll: 2x10   SwitchedOn: Plank w/ Dec Making NMR   3 rounds   D2 Flexion NMR X Yellow, 2x10 - garrett    D2 Extension  NMR X Red, 2x10 - garrett    Dynamic Hug NMR X Green band, 2x10   ER at 90 NMR X Yellow, 1x10 - garrett    10 minutes of Manual therapy (MT) to improve pain and ROM. (90475)   10 minutes of Therapeutic Exercise (TE) to develop strength, endurance, ROM, and flexibility. (00950)  30 minutes of Neuromuscular Re-Education (NMR)  to improve: Balance, Coordination, Kinesthetic, Sense, Proprioception, and Posture. (19692)        Patient Education and Home Exercises     Home Exercises Provided and Patient Education Provided     Education provided:   - reviewed for understanding.    Written Home Exercises Provided: Patient instructed to cont prior HEP. Exercises were reviewed and Selena was able to demonstrate them prior to the end of the session.  Selena demonstrated good  understanding of the education provided. See EMR under Patient Instructions for exercises provided during therapy sessions    ASSESSMENT   Patient reported decreased shoulder pain with AROM after MT. Added D2 Flexion and Extension during NMR to improve motor control. Patient tolerated today's session well without complaint of pain.       Selena Is progressing well towards her goals.   Pt prognosis is Excellent.     Pt will continue to benefit from skilled outpatient physical therapy to address the deficits listed in the problem list box on initial evaluation, provide pt/family education and to maximize pt's level of independence in the home and community environment.     Pt's spiritual, cultural and educational needs considered and pt agreeable to plan of " care and goals.    Goals:   Short Term Goals:  4 weeks Status  Date Met   PAIN: Pt will report worst pain of 4/10 in order to progress toward max functional ability and improve quality of life. [x] Progressing  [] Met  [] Not Met     FUNCTION: Patient will demonstrate improved function as indicated by a functional score of greater than or equal to 70 out of 100 on FOTO. [x] Progressing  [] Met  [] Not Met     MOBILITY: Patient will improve AROM to 50% of stated goals, listed in objective measures above, in order to progress towards independence with functional activities.  [x] Progressing  [] Met  [] Not Met     STRENGTH: Patient will improve strength to 50% of stated goals, listed in objective measures above, in order to progress towards independence with functional activities. [x] Progressing  [] Met  [] Not Met     HEP: Patient will demonstrate independence with HEP in order to progress toward functional independence. [x] Progressing  [] Met  [] Not Met        Long Term Goals:  6 weeks Status Date Met   PAIN: Pt will report worst pain of 2/10 in order to progress toward max functional ability and improve quality of life [x] Progressing  [] Met  [] Not Met     FUNCTION: Patient will demonstrate improved function as indicated by a functional score of greater than or equal to 75 out of 100 on FOTO. [x] Progressing  [] Met  [] Not Met     MOBILITY: Patient will improve AROM to stated goals, listed in objective measures above, in order to return to maximal functional potential and improve quality of life. Full, pain-free shoulder range of motion bilaterally [x] Progressing  [] Met  [] Not Met     STRENGTH: Patient will improve strength to stated goals, listed in objective measures above, in order to improve functional independence and quality of life. Goal: 5/5 and pain-free for all shoulder measures bilaterally [x] Progressing  [] Met  [] Not Met     Patient will return to gym activity with less than or equal to 2/10  pain and maximal function.  [x] Progressing  [] Met  [] Not Met         PLAN   Progress with emphasis on rotator cuff strength.         Ralph Potts, PT, DPT, DPT, SCS

## 2025-06-06 ENCOUNTER — CLINICAL SUPPORT (OUTPATIENT)
Facility: HOSPITAL | Age: 45
End: 2025-06-06
Payer: COMMERCIAL

## 2025-06-06 DIAGNOSIS — M25.611 DECREASED ROM OF RIGHT SHOULDER: Primary | ICD-10-CM

## 2025-06-06 DIAGNOSIS — R29.898 WEAKNESS OF RIGHT SHOULDER: ICD-10-CM

## 2025-06-06 PROCEDURE — 97112 NEUROMUSCULAR REEDUCATION: CPT | Mod: PN | Performed by: PHYSICAL THERAPIST

## 2025-06-06 PROCEDURE — 97140 MANUAL THERAPY 1/> REGIONS: CPT | Mod: PN | Performed by: PHYSICAL THERAPIST

## 2025-06-06 PROCEDURE — 97110 THERAPEUTIC EXERCISES: CPT | Mod: PN | Performed by: PHYSICAL THERAPIST

## 2025-06-06 NOTE — PROGRESS NOTES
[facility]   Physical Therapy Treatment Note      Patient Name: Selena Spring  MRN: [id number]  YOB: 1980  Encounter Date: 6/6/2025    Therapy Diagnosis:   Encounter Diagnoses   Name Primary?    Decreased ROM of right shoulder Yes    Weakness of right shoulder      Physician: Cachorro Ramirez MD    Physician Orders: Eval and Treat  Medical Diagnosis: Rotator cuff impingement syndrome of right shoulder  Right shoulder pain, unspecified chronicity    Visit # / Visits Authorized:  17 / 22  Insurance Authorization Period: 1/25/2025 to 8/1/2025  Date of Evaluation:  1/25/2025   Plan of Care Certification: 5/23/25 to 8/1/25    Time In: 10:30  Time Out: 11:43  Total Billable Time: 73 minutes    SUBJECTIVE     Pt reports: mild to moderate shoulder pain today. Patient reports increased pain after performing new exercises for HEP at home.     Pain: 3/10  Location: right upper trap and serratus    OBJECTIVE     RANGE OF MOTION:  *denotes pain     Cervical ROM  (Degrees) Right    Left    Goal   Cervical Side Bending  45 45 45   Cervical Rotation  90 80 90     Shoulder ROM  (Degrees) Right Left Goal   Functional Internal Rotation  Dysfunctional Painful  Funcitonal Nonpainful  Funcitonal Nonpainful    Function External Rotation  Functional Painful  Funcitonal Nonpainful  Funcitonal Nonpainful          STRENGTH:  *denotes pain     MMT Right Left Goal   Infraspinatus  4+/5* 4+/5 5/5   Subscapularis  5/5* 5/5* 5/5   Supraspinatus  5/5* 5/5 5/5   Teres Minor 4+/5 4+/5* 5/5   Rhomboid NT NT 5/5   Lower Trapezius  NT NT 5/5   Latissimus Dorsi NT NT 5/5         FUNCTION:     Intake Outcome Measure for FOTO Shoulder Survey    Therapist reviewed FOTO scores for Selena Spring on 9/9/2024.   FOTO report - see Media section or FOTO account episode details.    Intake Score: 62%         Treatment     Selena received the treatments listed below:    Intervention Code  (see below chart)  "Performed  Today Date/Notes  6/6/25   ART MT   Garrett UT, LS, CE   GH Mobs MT X  X Post: 30"x3  Inf: 30"x3   UBE  TE X 3 FW/3 BW   Pec Stretch TE   Corner: 30"x3   Foam Roll Series          Supine Serratus Punch  NMR X 5#, 3x10   Serratus Wall Slide NMR X Foam roll with YTB: 2x10   SwitchedOn: Plank w/ Dec Making NMR   3 rounds   D2 Flexion NMR X Yellow, 3x10 - garrett    D2 Extension  NMR X Red, 3x10 - garrett    Dynamic Hug NMR   Green band, 2x10   ER at 90 NMR   Yellow, 1x10 - garrett    Rows - seated NMR X 7.5# 3x10   Lat Pulldown - seated NMR X 25#, 3x10     10 minutes of Manual therapy (MT) to improve pain and ROM. (14311)   10 minutes of Therapeutic Exercise (TE) to develop strength, endurance, ROM, and flexibility. (71733)  30 minutes of Neuromuscular Re-Education (NMR)  to improve: Balance, Coordination, Kinesthetic, Sense, Proprioception, and Posture. ([id number])        Patient Education and Home Exercises     Home Exercises Provided and Patient Education Provided     Education provided:   - reviewed for understanding.    Written Home Exercises Provided: Patient instructed to cont prior HEP. Exercises were reviewed and Selena was able to demonstrate them prior to the end of the session.  Selena demonstrated good  understanding of the education provided. See EMR under Patient Instructions for exercises provided during therapy sessions    ASSESSMENT   Patient reported decreased shoulder pain with AROM after MT. Added D2 Flexion and Extension during NMR to improve motor control. Patient tolerated today's session well without complaint of pain.       Selena Is progressing well towards her goals.   Pt prognosis is Excellent.     Pt will continue to benefit from skilled outpatient physical therapy to address the deficits listed in the problem list box on initial evaluation, provide pt/family education and to maximize pt's level of independence in the home and community environment.     Pt's spiritual, cultural and educational " needs considered and pt agreeable to plan of care and goals.    Goals:   Short Term Goals:  4 weeks Status  Date Met   PAIN: Pt will report worst pain of 4/10 in order to progress toward max functional ability and improve quality of life. [x] Progressing  [] Met  [] Not Met     FUNCTION: Patient will demonstrate improved function as indicated by a functional score of greater than or equal to 70 out of 100 on FOTO. [x] Progressing  [] Met  [] Not Met     MOBILITY: Patient will improve AROM to 50% of stated goals, listed in objective measures above, in order to progress towards independence with functional activities.  [x] Progressing  [] Met  [] Not Met     STRENGTH: Patient will improve strength to 50% of stated goals, listed in objective measures above, in order to progress towards independence with functional activities. [x] Progressing  [] Met  [] Not Met     HEP: Patient will demonstrate independence with HEP in order to progress toward functional independence. [x] Progressing  [] Met  [] Not Met        Long Term Goals:  6 weeks Status Date Met   PAIN: Pt will report worst pain of 2/10 in order to progress toward max functional ability and improve quality of life [x] Progressing  [] Met  [] Not Met     FUNCTION: Patient will demonstrate improved function as indicated by a functional score of greater than or equal to 75 out of 100 on FOTO. [x] Progressing  [] Met  [] Not Met     MOBILITY: Patient will improve AROM to stated goals, listed in objective measures above, in order to return to maximal functional potential and improve quality of life. Full, pain-free shoulder range of motion bilaterally [x] Progressing  [] Met  [] Not Met     STRENGTH: Patient will improve strength to stated goals, listed in objective measures above, in order to improve functional independence and quality of life. Goal: 5/5 and pain-free for all shoulder measures bilaterally [x] Progressing  [] Met  [] Not Met     Patient will return to  gym activity with less than or equal to 2/10 pain and maximal function.  [x] Progressing  [] Met  [] Not Met         PLAN   Progress with emphasis on rotator cuff strength.         Ralph Potts, PT, DPT, DPT, SCS

## 2025-06-16 ENCOUNTER — CLINICAL SUPPORT (OUTPATIENT)
Facility: HOSPITAL | Age: 45
End: 2025-06-16
Payer: COMMERCIAL

## 2025-06-16 DIAGNOSIS — M25.611 DECREASED ROM OF RIGHT SHOULDER: Primary | ICD-10-CM

## 2025-06-16 DIAGNOSIS — R29.898 WEAKNESS OF RIGHT SHOULDER: ICD-10-CM

## 2025-06-16 PROCEDURE — 97112 NEUROMUSCULAR REEDUCATION: CPT | Mod: PN | Performed by: PHYSICAL THERAPIST

## 2025-06-16 PROCEDURE — 97140 MANUAL THERAPY 1/> REGIONS: CPT | Mod: PN | Performed by: PHYSICAL THERAPIST

## 2025-06-16 PROCEDURE — 97110 THERAPEUTIC EXERCISES: CPT | Mod: PN | Performed by: PHYSICAL THERAPIST

## 2025-06-16 NOTE — PROGRESS NOTES
[facility]   Physical Therapy Treatment Note      Patient Name: Selena Spring  MRN: [id number]  YOB: 1980  Encounter Date: 6/16/2025    Therapy Diagnosis:   Encounter Diagnoses   Name Primary?    Decreased ROM of right shoulder Yes    Weakness of right shoulder      Physician: Cachorro Ramirez MD    Physician Orders: Eval and Treat  Medical Diagnosis: Rotator cuff impingement syndrome of right shoulder  Right shoulder pain, unspecified chronicity    Visit # / Visits Authorized:  18 / 22  Insurance Authorization Period: 1/25/2025 to 8/1/2025  Date of Evaluation:  1/25/2025   Plan of Care Certification: 5/23/25 to 8/1/25    Time In: 10:30  Time Out: 11:43  Total Billable Time: 73 minutes    SUBJECTIVE     Pt reports: mild to moderate shoulder pain today. Patient reports increased pain after performing new exercises for HEP at home.     Pain: 3/10  Location: right upper trap and serratus    OBJECTIVE     RANGE OF MOTION:  *denotes pain     Cervical ROM  (Degrees) Right    Left    Goal   Cervical Side Bending  45 45 45   Cervical Rotation  90 80 90     Shoulder ROM  (Degrees) Right Left Goal   Functional Internal Rotation  Dysfunctional Painful  Funcitonal Nonpainful  Funcitonal Nonpainful    Function External Rotation  Functional Painful  Funcitonal Nonpainful  Funcitonal Nonpainful          STRENGTH:  *denotes pain     MMT Right Left Goal   Infraspinatus  4+/5* 4+/5 5/5   Subscapularis  5/5* 5/5* 5/5   Supraspinatus  5/5* 5/5 5/5   Teres Minor 4+/5 4+/5* 5/5   Rhomboid NT NT 5/5   Lower Trapezius  NT NT 5/5   Latissimus Dorsi NT NT 5/5         FUNCTION:     Intake Outcome Measure for FOTO Shoulder Survey    Therapist reviewed FOTO scores for Selena Spring on 9/9/2024.   FOTO report - see Media section or FOTO account episode details.    Intake Score: 62%         Treatment     Selena received the treatments listed below:    Intervention Code  (see below chart)  "Performed  Today Date/Notes  6/16/25   ART MT X R infraspinatus   GH Mobs MT X  X Post: 30"x3  Inf: 30"x3   UBE  TE X 3 FW/3 BW   Foam Roll Series  TE  X 10x each   Supine Serratus Punch  NMR X 5#, 3x10   D2 Flexion NMR X Yellow, 2x10 - jael    D2 Extension  NMR X Red, 2x10 - jael    Rows - seated NMR X 10# 3x10   Lat Pulldown - seated NMR X 15#, 3x10   Updated HEP TE X Serratus Wall Slide, ER at 0, IR at 0, and Sidelying ER   15 minutes of Manual therapy (MT) to improve pain and ROM. (86011)   15 minutes of Therapeutic Exercise (TE) to develop strength, endurance, ROM, and flexibility. ([id number])  30 minutes of Neuromuscular Re-Education (NMR)  to improve: Balance, Coordination, Kinesthetic, Sense, Proprioception, and Posture. (98630)        Patient Education and Home Exercises     Home Exercises Provided and Patient Education Provided     Education provided:   - reviewed for understanding.    Written Home Exercises Provided: Patient instructed to cont prior HEP. Exercises were reviewed and Selena was able to demonstrate them prior to the end of the session.  Selena demonstrated good  understanding of the education provided. See EMR under Patient Instructions for exercises provided during therapy sessions    ASSESSMENT   Patient reported decreased shoulder pain with AROM after MT. Added D2 Flexion and Extension during NMR to improve motor control. Patient tolerated today's session well without complaint of pain.       Selena Is progressing well towards her goals.   Pt prognosis is Excellent.     Pt will continue to benefit from skilled outpatient physical therapy to address the deficits listed in the problem list box on initial evaluation, provide pt/family education and to maximize pt's level of independence in the home and community environment.     Pt's spiritual, cultural and educational needs considered and pt agreeable to plan of care and goals.    Goals:   Short Term Goals:  4 weeks Status  Date Met   PAIN: Pt " will report worst pain of 4/10 in order to progress toward max functional ability and improve quality of life. [x] Progressing  [] Met  [] Not Met     FUNCTION: Patient will demonstrate improved function as indicated by a functional score of greater than or equal to 70 out of 100 on FOTO. [x] Progressing  [] Met  [] Not Met     MOBILITY: Patient will improve AROM to 50% of stated goals, listed in objective measures above, in order to progress towards independence with functional activities.  [x] Progressing  [] Met  [] Not Met     STRENGTH: Patient will improve strength to 50% of stated goals, listed in objective measures above, in order to progress towards independence with functional activities. [x] Progressing  [] Met  [] Not Met     HEP: Patient will demonstrate independence with HEP in order to progress toward functional independence. [x] Progressing  [] Met  [] Not Met        Long Term Goals:  6 weeks Status Date Met   PAIN: Pt will report worst pain of 2/10 in order to progress toward max functional ability and improve quality of life [x] Progressing  [] Met  [] Not Met     FUNCTION: Patient will demonstrate improved function as indicated by a functional score of greater than or equal to 75 out of 100 on FOTO. [x] Progressing  [] Met  [] Not Met     MOBILITY: Patient will improve AROM to stated goals, listed in objective measures above, in order to return to maximal functional potential and improve quality of life. Full, pain-free shoulder range of motion bilaterally [x] Progressing  [] Met  [] Not Met     STRENGTH: Patient will improve strength to stated goals, listed in objective measures above, in order to improve functional independence and quality of life. Goal: 5/5 and pain-free for all shoulder measures bilaterally [x] Progressing  [] Met  [] Not Met     Patient will return to gym activity with less than or equal to 2/10 pain and maximal function.  [x] Progressing  [] Met  [] Not Met         PLAN    Progress with emphasis on rotator cuff strength.         Ralph Potts, PT, DPT, DPT, SCS

## 2025-08-22 ENCOUNTER — PATIENT MESSAGE (OUTPATIENT)
Dept: SPORTS MEDICINE | Facility: CLINIC | Age: 45
End: 2025-08-22
Payer: COMMERCIAL